# Patient Record
Sex: MALE | Race: WHITE | NOT HISPANIC OR LATINO | Employment: OTHER | URBAN - METROPOLITAN AREA
[De-identification: names, ages, dates, MRNs, and addresses within clinical notes are randomized per-mention and may not be internally consistent; named-entity substitution may affect disease eponyms.]

---

## 2017-11-02 ENCOUNTER — GENERIC CONVERSION - ENCOUNTER (OUTPATIENT)
Dept: OTHER | Facility: OTHER | Age: 77
End: 2017-11-02

## 2017-11-03 ENCOUNTER — ALLSCRIPTS OFFICE VISIT (OUTPATIENT)
Dept: OTHER | Facility: OTHER | Age: 77
End: 2017-11-03

## 2017-11-03 LAB
BILIRUB UR QL STRIP: NORMAL
CLARITY UR: NORMAL
COLOR UR: YELLOW
GLUCOSE (HISTORICAL): NORMAL
HGB UR QL STRIP.AUTO: NORMAL
KETONES UR STRIP-MCNC: NORMAL MG/DL
LEUKOCYTE ESTERASE UR QL STRIP: NORMAL
NITRITE UR QL STRIP: NORMAL
PH UR STRIP.AUTO: 6.5 [PH]
PROT UR STRIP-MCNC: NORMAL MG/DL
SP GR UR STRIP.AUTO: 1.01
UROBILINOGEN UR QL STRIP.AUTO: 0.2

## 2018-01-13 VITALS
DIASTOLIC BLOOD PRESSURE: 82 MMHG | BODY MASS INDEX: 27.58 KG/M2 | HEIGHT: 68 IN | WEIGHT: 182 LBS | SYSTOLIC BLOOD PRESSURE: 136 MMHG

## 2018-01-14 NOTE — MISCELLANEOUS
Message   Recorded as Task  Date: 11/02/2017 12:05 PM, Created By: Ricki Randle  Task Name: Call Back  Assigned To: Kevin FultonB,TEAM  Regarding Patient: Lisa Murphy, Status: Active  CommentIsreal Prileslie - 02 Nov 2017 12:05 PM    TASK CREATED  Caller: Self; [phone] (Home)  Pt asking for medication recommendation as Doxazosin not helping he's having frequency & slow stream  Natalie Stiles - 02 Nov 2017 12:25 PM    TASK EDITED  SPOKE TO PT  TAKING DOXAZOSIN 2 MG DAILY  STREAM NOW SLOW AND NOCTURIA INCREASED FROM 1X TO 3X'S  DECLINED APPT TO BE RE-EVALUATED  WILL DIRECT TO DR Anahi Jose  PER DR VOGT, PT NEEDS CYSTO TO RE-EVALUATE BPH  PT NOTIFIED APPT 11/3 1        1 Amended By: Dulce Wilkerson;  Nov 02 2017 2:48 PM EST    Signatures   Electronically signed by : Marsha Morales RN; Nov 2 2017  2:48PM EST                       (Author)

## 2018-08-21 DIAGNOSIS — N13.9 BENIGN LOCALIZED HYPERPLASIA OF PROSTATE WITH URINARY OBSTRUCTION AND LOWER URINARY TRACT SYMPTOMS: Primary | ICD-10-CM

## 2018-08-21 DIAGNOSIS — N40.1 BENIGN LOCALIZED HYPERPLASIA OF PROSTATE WITH URINARY OBSTRUCTION AND LOWER URINARY TRACT SYMPTOMS: Primary | ICD-10-CM

## 2018-08-21 RX ORDER — FINASTERIDE 5 MG/1
5 TABLET, FILM COATED ORAL DAILY
Qty: 90 TABLET | Refills: 0 | Status: SHIPPED | OUTPATIENT
Start: 2018-08-21 | End: 2018-09-06 | Stop reason: SDUPTHER

## 2018-08-21 NOTE — TELEPHONE ENCOUNTER
Patient called requesting refill on Finasterid 5mg    Request for same, 90 day supply with NO refills was queued and forwarded to Dr Eulogio Cain for approval

## 2018-08-30 ENCOUNTER — TELEPHONE (OUTPATIENT)
Dept: UROLOGY | Facility: AMBULATORY SURGERY CENTER | Age: 78
End: 2018-08-30

## 2018-08-30 NOTE — TELEPHONE ENCOUNTER
Spoke to patient who stated he was very clear that the refill script for Finasteride to be sent to [facility] mail order and it was sent to [facility] his secondary pharmacy  Will forward to Dr Oneyda Ray to resend to [facility] pharmacy

## 2018-09-06 DIAGNOSIS — N40.1 BENIGN LOCALIZED HYPERPLASIA OF PROSTATE WITH URINARY OBSTRUCTION AND LOWER URINARY TRACT SYMPTOMS: ICD-10-CM

## 2018-09-06 DIAGNOSIS — N13.9 BENIGN LOCALIZED HYPERPLASIA OF PROSTATE WITH URINARY OBSTRUCTION AND LOWER URINARY TRACT SYMPTOMS: ICD-10-CM

## 2018-09-06 RX ORDER — FINASTERIDE 5 MG/1
5 TABLET, FILM COATED ORAL DAILY
Qty: 90 TABLET | Refills: 0 | Status: SHIPPED | OUTPATIENT
Start: 2018-09-06 | End: 2018-12-04 | Stop reason: SDUPTHER

## 2018-09-06 NOTE — TELEPHONE ENCOUNTER
Pt is calling regarding the medications that was sent to the wrong pharmacy  He still has not received the medication   Please advise [phone]

## 2018-09-06 NOTE — TELEPHONE ENCOUNTER
Spoke to pt and the new RX for the finasteride was cued up and sent to Dr Ave Snow to approve for [address]

## 2018-10-11 RX ORDER — IBUPROFEN 600 MG/1
600 TABLET ORAL EVERY 6 HOURS PRN
Refills: 0 | COMMUNITY
Start: 2018-09-23

## 2018-10-15 ENCOUNTER — OFFICE VISIT (OUTPATIENT)
Dept: UROLOGY | Facility: MEDICAL CENTER | Age: 78
End: 2018-10-15
Payer: MEDICARE

## 2018-10-15 VITALS
BODY MASS INDEX: 26.96 KG/M2 | WEIGHT: 182 LBS | SYSTOLIC BLOOD PRESSURE: 142 MMHG | HEIGHT: 69 IN | DIASTOLIC BLOOD PRESSURE: 82 MMHG

## 2018-10-15 DIAGNOSIS — N40.1 BPH WITH OBSTRUCTION/LOWER URINARY TRACT SYMPTOMS: Primary | ICD-10-CM

## 2018-10-15 DIAGNOSIS — N13.8 BPH WITH OBSTRUCTION/LOWER URINARY TRACT SYMPTOMS: Primary | ICD-10-CM

## 2018-10-15 LAB
SL AMB  POCT GLUCOSE, UA: NORMAL
SL AMB LEUKOCYTE ESTERASE,UA: NORMAL
SL AMB POCT BILIRUBIN,UA: NORMAL
SL AMB POCT BLOOD,UA: NORMAL
SL AMB POCT CLARITY,UA: CLEAR
SL AMB POCT COLOR,UA: YELLOW
SL AMB POCT KETONES,UA: NORMAL
SL AMB POCT NITRITE,UA: NORMAL
SL AMB POCT PH,UA: 7
SL AMB POCT SPECIFIC GRAVITY,UA: 1.02
SL AMB POCT URINE PROTEIN: NORMAL
SL AMB POCT UROBILINOGEN: 0.2

## 2018-10-15 PROCEDURE — 99213 OFFICE O/P EST LOW 20 MIN: CPT | Performed by: UROLOGY

## 2018-10-15 PROCEDURE — 81003 URINALYSIS AUTO W/O SCOPE: CPT | Performed by: UROLOGY

## 2018-10-15 RX ORDER — MELATONIN
1000 DAILY
COMMUNITY
End: 2020-08-06 | Stop reason: ALTCHOICE

## 2018-10-15 RX ORDER — DOXAZOSIN 2 MG/1
2 TABLET ORAL
COMMUNITY
End: 2018-10-25 | Stop reason: ALTCHOICE

## 2018-10-15 NOTE — PROGRESS NOTES
Assessment/Plan:   1  Symptomatic BPH, patient feels like things are acceptable on his meds  2   Last PSA 3 5, he does not want any more PSA testing  3   Prior cysto showed some degree of outlet obstruction, will continue to follow conservatively and less symptoms worse  4   Follow-up one year   Diagnoses and all orders for this visit:    BPH with obstruction/lower urinary tract symptoms  -     POCT urine dip auto non-scope    Other orders  -     ibuprofen (MOTRIN) 600 mg tablet; Take 600 mg by mouth every 6 (six) hours as needed  -     cholecalciferol (VITAMIN D3) 1,000 units tablet; Take 1,000 Units by mouth daily  -     doxazosin (CARDURA) 2 mg tablet; Take 2 mg by mouth daily at bedtime          Subjective:     Patient ID: Sonny Majano is a 66 y o  male  Follow-up for BPH, on Cardura and finasteride for some years  Symptoms about the same, slow flow, some urgency, but stream is okay, feels like empties all right  Nocturia times 1-2  No hematuria infections stones  Review of Systems   All other systems reviewed and are negative  Objective:     Physical Exam   Constitutional: He is oriented to person, place, and time  He appears well-developed and well-nourished  No distress  HENT:   Head: Normocephalic and atraumatic  Eyes: Conjunctivae are normal    Cardiovascular: Normal rate and regular rhythm  Pulmonary/Chest: Effort normal and breath sounds normal  No respiratory distress  He has no wheezes  Abdominal: Soft  Bowel sounds are normal  He exhibits no distension and no mass  There is no tenderness  Genitourinary:   Genitourinary Comments: Penis and testes normal    Prostate moderately enlarged bulky no changes   Neurological: He is alert and oriented to person, place, and time  Skin: Skin is warm and dry  He is not diaphoretic

## 2018-10-15 NOTE — LETTER
November 2, 2018     Tarun Coppola MD  [address] [address] [employer]    Patient: Jose Daniel Nguyễn   YOB: 1940   Date of Visit: 10/15/2018       Dear Dr Jaci Campbell: Thank you for referring Odalys Voss to me for evaluation  Below are my notes for this consultation  If you have questions, please do not hesitate to call me  I look forward to following your patient along with you  Sincerely,        Adria Ewing MD        CC: No Recipients  Adria Ewing MD  10/15/2018 11:44 AM  Sign at close encounter  Assessment/Plan:   1  Symptomatic BPH, patient feels like things are acceptable on his meds  2   Last PSA 3 5, he does not want any more PSA testing  3   Prior cysto showed some degree of outlet obstruction, will continue to follow conservatively and less symptoms worse  4   Follow-up one year   Diagnoses and all orders for this visit:    BPH with obstruction/lower urinary tract symptoms  -     POCT urine dip auto non-scope    Other orders  -     ibuprofen (MOTRIN) 600 mg tablet; Take 600 mg by mouth every 6 (six) hours as needed  -     cholecalciferol (VITAMIN D3) 1,000 units tablet; Take 1,000 Units by mouth daily  -     doxazosin (CARDURA) 2 mg tablet; Take 2 mg by mouth daily at bedtime          Subjective:     Patient ID: Jose Daniel Nguyễn is a 66 y o  male  Follow-up for BPH, on Cardura and finasteride for some years  Symptoms about the same, slow flow, some urgency, but stream is okay, feels like empties all right  Nocturia times 1-2  No hematuria infections stones  Review of Systems   All other systems reviewed and are negative  Objective:     Physical Exam   Constitutional: He is oriented to person, place, and time  He appears well-developed and well-nourished  No distress  HENT:   Head: Normocephalic and atraumatic  Eyes: Conjunctivae are normal    Cardiovascular: Normal rate and regular rhythm      Pulmonary/Chest: Effort normal and breath sounds normal  No respiratory distress  He has no wheezes  Abdominal: Soft  Bowel sounds are normal  He exhibits no distension and no mass  There is no tenderness  Genitourinary:   Genitourinary Comments: Penis and testes normal    Prostate moderately enlarged bulky no changes   Neurological: He is alert and oriented to person, place, and time  Skin: Skin is warm and dry  He is not diaphoretic

## 2018-10-25 ENCOUNTER — TELEPHONE (OUTPATIENT)
Dept: UROLOGY | Facility: AMBULATORY SURGERY CENTER | Age: 78
End: 2018-10-25

## 2018-10-25 DIAGNOSIS — N40.1 BPH WITH OBSTRUCTION/LOWER URINARY TRACT SYMPTOMS: Primary | ICD-10-CM

## 2018-10-25 DIAGNOSIS — N13.8 BPH WITH OBSTRUCTION/LOWER URINARY TRACT SYMPTOMS: Primary | ICD-10-CM

## 2018-10-25 RX ORDER — TAMSULOSIN HYDROCHLORIDE 0.4 MG/1
CAPSULE ORAL
Qty: 30 CAPSULE | Refills: 0 | Status: SHIPPED | OUTPATIENT
Start: 2018-10-25 | End: 2018-11-21 | Stop reason: SDUPTHER

## 2018-10-25 RX ORDER — TAMSULOSIN HYDROCHLORIDE 0.4 MG/1
CAPSULE ORAL
Qty: 90 CAPSULE | Refills: 3 | Status: ON HOLD | OUTPATIENT
Start: 2018-10-25 | End: 2020-10-20

## 2018-10-25 NOTE — TELEPHONE ENCOUNTER
Spoke with patient, he said he wants to stop taking  doxazosin due to blood pressure issues according to his PCP  He would like to try flomax  Please call patient back  Thank you

## 2018-10-31 ENCOUNTER — TELEPHONE (OUTPATIENT)
Dept: UROLOGY | Facility: MEDICAL CENTER | Age: 78
End: 2018-10-31

## 2018-10-31 NOTE — TELEPHONE ENCOUNTER
Patient calling stating [facility] pharmacy NOT processing his new script for Tamsulosin because they think he's still taking Doxazosin  Patient gave me a telephone number to call and clear-up the problem ([phone])  Patient provided pharmacy benefit information of:  BIN#:  [id number] - PCN#:  [id number] - GRP#:   - ID#:  [id number]  I called the above number and spoke with [facility]  [employer] still requires a prior authorization stating why that patient switched from one drug to another  Patient mentioned that the Doxazosin was affecting his blood pressure to much and his PCP wants his off the drug  Prior Auth system "crashed" as we were nearing the end of the request       I hung up with them and attempted to submit the Prior Authorization via [employer]  Apparently his ID number was incorrect  I then called back and spoke with Karime  Prior authorization completed by phone  Final determination is expected within 24-72 hours

## 2018-11-02 NOTE — TELEPHONE ENCOUNTER
Prior Authorization for Tamsulosin 0 4mg was APPROVED and valid from 11/2/18 until 11/2/19  Patient aware of same

## 2018-11-21 DIAGNOSIS — N13.8 BPH WITH OBSTRUCTION/LOWER URINARY TRACT SYMPTOMS: ICD-10-CM

## 2018-11-21 DIAGNOSIS — N40.1 BPH WITH OBSTRUCTION/LOWER URINARY TRACT SYMPTOMS: ICD-10-CM

## 2018-11-21 RX ORDER — TAMSULOSIN HYDROCHLORIDE 0.4 MG/1
CAPSULE ORAL
Qty: 30 CAPSULE | Refills: 0 | Status: SHIPPED | OUTPATIENT
Start: 2018-11-21 | End: 2019-09-23 | Stop reason: SDUPTHER

## 2018-12-04 DIAGNOSIS — N13.9 BENIGN LOCALIZED HYPERPLASIA OF PROSTATE WITH URINARY OBSTRUCTION AND LOWER URINARY TRACT SYMPTOMS: ICD-10-CM

## 2018-12-04 DIAGNOSIS — N40.1 BENIGN LOCALIZED HYPERPLASIA OF PROSTATE WITH URINARY OBSTRUCTION AND LOWER URINARY TRACT SYMPTOMS: ICD-10-CM

## 2018-12-04 RX ORDER — FINASTERIDE 5 MG/1
TABLET, FILM COATED ORAL
Qty: 90 TABLET | Refills: 3 | Status: SHIPPED | OUTPATIENT
Start: 2018-12-04 | End: 2020-08-06 | Stop reason: ALTCHOICE

## 2019-07-01 ENCOUNTER — TRANSCRIBE ORDERS (OUTPATIENT)
Dept: PHYSICAL THERAPY | Facility: CLINIC | Age: 79
End: 2019-07-01

## 2019-07-01 ENCOUNTER — EVALUATION (OUTPATIENT)
Dept: PHYSICAL THERAPY | Facility: CLINIC | Age: 79
End: 2019-07-01
Payer: MEDICARE

## 2019-07-01 DIAGNOSIS — M75.121 COMPLETE TEAR OF RIGHT ROTATOR CUFF, UNSPECIFIED WHETHER TRAUMATIC: Primary | ICD-10-CM

## 2019-07-01 PROCEDURE — 97110 THERAPEUTIC EXERCISES: CPT | Performed by: PHYSICAL THERAPIST

## 2019-07-01 PROCEDURE — 97161 PT EVAL LOW COMPLEX 20 MIN: CPT | Performed by: PHYSICAL THERAPIST

## 2019-07-01 NOTE — PROGRESS NOTES
PT Evaluation     Today's date: 2019  Patient name: Yessy Yeh  : 1940  MRN: [id number]  Referring provider: Stann Goltz  Dx:   Encounter Diagnosis     ICD-10-CM    1  Complete tear of right rotator cuff, unspecified whether traumatic M75 121                   Assessment  Assessment details: Patient is a 78 y o  male who  presents with pain, range of motion loss, weakness associated with a diagnosis of a R rotator cuff tear  PMH significant for a previous repair about 10 years ago  Trying to avoid TSA   Functional limitations as a result of impairments  Patient may benefit from course of skilled physical therapy to address above listed impairments in an effort to improve function  Understanding of Dx/Px/POC: excellent  Goals  Short Term Goals:  1) Pain : Decrease R shoulder pain to 3/10 at worst x 1 continuous week within 3-4weeks  2) AROM: Improve AROM by at least , 6 degrees ER and 1vertebral levels for IR within 3-4weeks  3) Strength: Improved UE strength by at least 1/2 grade for all noted as weak within 3-4 weeks  4) Function: Improved FOTO score from IE within 3-4 weeks, patient to note greater use of arm for ADLs  LongTerm Goals:   1) Pain : Decrease R shoulder pain to 110 at worst x 1 continuous week within 6-8 weeks  2) AROM: Improve AROM to at least 10 degrees ER and to T7 level for IR within 6-8 weeks  3) Strength: Improved UE strength by at leas 1 grade within 6-8 weeks  4) Function: Improved FOTO score to at least 72 ; no reported difficulty with ADLs    5) (I) with HEP        Plan  Patient would benefit from: skilled PT  Planned modality interventions: cryotherapy, TENS, thermotherapy: hydrocollator packs and ultrasound  Planned therapy interventions: ADL training, body mechanics training, functional ROM exercises, home exercise program, joint mobilization, manual therapy, neuromuscular re-education, patient education, postural training, strengthening, stretching, therapeutic activities and therapeutic exercise  Frequency: 2-3 x's per week x 6-8 weeks  Treatment plan discussed with: patient        Subjective Evaluation    History of Present Illness  Mechanism of injury: Patient is a R hand dominant 78 y o  old male who presents for an initial outpatient physical therapy consultation regarding their R shoulder  Notes a history of a R rotator cuff repair about 10 years ago  Was doing well until about a month ago  when pain increased insidiously  Received steroidal injection which helped mildly  Went fly fishing shortly after this time and pain increased significantly with casting  MRI + for R RC tear, irreparable per patient  Now referred for course of therapy  He is hoping to avoid total shoulder replacement and to be able to return to fly fishing  Has pain with lifting, reaching activities/ADLs  Is unable to sleep on R side         Pain  Current pain ratin  At worst pain ratin  Quality: sharp and radiating  Aggravating factors: overhead activity and lifting    Social Support    Working: retired  Hand dominance: right  Life stress: PAtient is an avid fly fisherman  Has had pain with casting      Patient Goals  Patient goals for therapy: decreased pain, increased strength, independence with ADLs/IADLs and increased motion          Objective     Active Range of Motion     Right Shoulder   Flexion: 165 degrees with pain  Abduction: 165 degrees with pain  External rotation 90°: 80 degrees  Internal rotation BTB: T9 with pain    Passive Range of Motion     Right Shoulder   Normal passive range of motion  Internal rotation 90°: with pain    Strength/Myotome Testing     Right Shoulder     Planes of Motion   Flexion: 3+   Extension: 4   Abduction: 4-   Adduction: 5   External rotation at 0°: 3+   Internal rotation at 0°: 4+     Isolated Muscles   Biceps: 5   Lower trapezius: 3+   Rhomboids: 4-   Serratus anterior: 5   Triceps: 5     Tests Right Shoulder   Positive empty can and Hawkin's  Negative drop arm, external rotation lag sign and lift-off         Flowsheet Rows      Most Recent Value   PT/OT G-Codes   Current Score  59   Projected Score  72                  Precautions: none    Daily Treatment Diary       Manuals 7/1/19            PROM R shoulderER/IR (gentle) NV                                                   Exercise Diary              UBE NV            Tband Rows NV            Tband Extension NV            Tband IR NV            submax mando ER NV            No band robbery NV            Ball circles @ wall NV            Prone rhomboids NV            Prone middle traps NV            Manual rhythmic stabilization (FF/Ext, Horis Abd/Add @ 90 degrees flexion) NV                                                                                                                                                                        Modalities             Cryo R shoulder 10 min

## 2019-07-01 NOTE — LETTER
2019    Sydney Osler, PA-C  [facility]  [facility]    Patient: Josh Inman   YOB: 1940   Date of Visit: 2019     Encounter Diagnosis     ICD-10-CM    1  Complete tear of right rotator cuff, unspecified whether traumatic M75 121        Dear Dr Hummel Auxvasse:    Please review the attached Plan of Care from [facility] Cosmo's recent visit  Please verify that you agree therapy should continue by signing the attached document and sending it back to our office  If you have any questions or concerns, please don't hesitate to call  Sincerely,    Yolette Harding, PT      Referring Provider:      I certify that I have read the below Plan of Care and certify the need for these services furnished under this plan of treatment while under my care  Sydney Osler, PA-C  [facility]  [address] [place] [address] Street: [phone]          PT Evaluation     Today's date: 2019  Patient name: Josh Inman  : 1940  MRN: [id number]  Referring provider: Jazz Granados  Dx:   Encounter Diagnosis     ICD-10-CM    1  Complete tear of right rotator cuff, unspecified whether traumatic M75 121                   Assessment  Assessment details: Patient is a 78 y o  male who  presents with pain, range of motion loss, weakness associated with a diagnosis of a R rotator cuff tear  PMH significant for a previous repair about 10 years ago  Trying to avoid TSA   Functional limitations as a result of impairments  Patient may benefit from course of skilled physical therapy to address above listed impairments in an effort to improve function  Understanding of Dx/Px/POC: excellent  Goals  Short Term Goals:  1) Pain : Decrease R shoulder pain to 3/10 at worst x 1 continuous week within 3-4weeks    2) AROM: Improve AROM by at least , 6 degrees ER and 1vertebral levels for IR within 3-4weeks  3) Strength: Improved UE strength by at least 1/2 grade for all noted as weak within 3-4 weeks  4) Function: Improved FOTO score from IE within 3-4 weeks, patient to note greater use of arm for ADLs  LongTerm Goals:   1) Pain : Decrease R shoulder pain to 110 at worst x 1 continuous week within 6-8 weeks  2) AROM: Improve AROM to at least 10 degrees ER and to T7 level for IR within 6-8 weeks  3) Strength: Improved UE strength by at leas 1 grade within 6-8 weeks  4) Function: Improved FOTO score to at least 72 ; no reported difficulty with ADLs  5) (I) with HEP        Plan  Patient would benefit from: skilled PT  Planned modality interventions: cryotherapy, TENS, thermotherapy: hydrocollator packs and ultrasound  Planned therapy interventions: ADL training, body mechanics training, functional ROM exercises, home exercise program, joint mobilization, manual therapy, neuromuscular re-education, patient education, postural training, strengthening, stretching, therapeutic activities and therapeutic exercise  Frequency: 2-3 x's per week x 6-8 weeks  Treatment plan discussed with: patient        Subjective Evaluation    History of Present Illness  Mechanism of injury: Patient is a R hand dominant 78 y o  old male who presents for an initial outpatient physical therapy consultation regarding their R shoulder  Notes a history of a R rotator cuff repair about 10 years ago  Was doing well until about a month ago  when pain increased insidiously  Received steroidal injection which helped mildly  Went fly fishing shortly after this time and pain increased significantly with casting  MRI + for R RC tear, irreparable per patient  Now referred for course of therapy  He is hoping to avoid total shoulder replacement and to be able to return to fly fishing  Has pain with lifting, reaching activities/ADLs  Is unable to sleep on R side                 Pain  Current pain ratin  At worst pain rating: 6  Quality: sharp and radiating  Aggravating factors: overhead activity and lifting    Social Support    Working: retired  Hand dominance: right  Life stress: PAtient is an avid fly fisherman  Has had pain with casting  Patient Goals  Patient goals for therapy: decreased pain, increased strength, independence with ADLs/IADLs and increased motion          Objective     Active Range of Motion     Right Shoulder   Flexion: 165 degrees with pain  Abduction: 165 degrees with pain  External rotation 90°:  80 degrees  Internal rotation BTB: T9 with pain    Passive Range of Motion     Right Shoulder   Normal passive range of motion  Internal rotation 90°:  with pain    Strength/Myotome Testing     Right Shoulder     Planes of Motion   Flexion: 3+   Extension: 4   Abduction: 4-   Adduction: 5   External rotation at 0°:  3+   Internal rotation at 0°:  4+     Isolated Muscles   Biceps: 5   Lower trapezius: 3+   Rhomboids: 4-   Serratus anterior: 5   Triceps: 5     Tests     Right Shoulder   Positive empty can and Hawkin's  Negative drop arm, external rotation lag sign and lift-off         Flowsheet Rows      Most Recent Value   PT/OT G-Codes   Current Score  59   Projected Score  72                  Precautions: none    Daily Treatment Diary       Manuals 7/1/19            PROM R shoulderER/IR (gentle) NV                                                   Exercise Diary              UBE NV            Tband Rows NV            Tband Extension NV            Tband IR NV            submax mando ER NV            No band robbery NV            Ball circles @ wall NV            Prone rhomboids NV            Prone middle traps NV            Manual rhythmic stabilization (FF/Ext, Horis Abd/Add @ 90 degrees flexion) NV                                                                                                                                                                        Modalities             Cryo R shoulder 10 min

## 2019-07-03 ENCOUNTER — OFFICE VISIT (OUTPATIENT)
Dept: PHYSICAL THERAPY | Facility: CLINIC | Age: 79
End: 2019-07-03
Payer: MEDICARE

## 2019-07-03 DIAGNOSIS — M75.121 COMPLETE TEAR OF RIGHT ROTATOR CUFF, UNSPECIFIED WHETHER TRAUMATIC: Primary | ICD-10-CM

## 2019-07-03 PROCEDURE — 97140 MANUAL THERAPY 1/> REGIONS: CPT | Performed by: PHYSICAL THERAPIST

## 2019-07-03 PROCEDURE — 97110 THERAPEUTIC EXERCISES: CPT | Performed by: PHYSICAL THERAPIST

## 2019-07-03 NOTE — PROGRESS NOTES
Daily Note     Today's date: 7/3/2019  Patient name: Rashida Heller  : 1940  MRN: [id number]  Referring provider: Gerard Bailon  Dx:   Encounter Diagnosis     ICD-10-CM    1  Complete tear of right rotator cuff, unspecified whether traumatic M75 121                   Subjective: Noting compliance with HEP  Shoulder feeling a little better so patient is optimistic  Objective: See treatment diary below  Progressed UE strengthening  Assessment: Tolerated treatment well  Patient demonstrated fatigue post treatment Patient would benefit from continued course of skilled physical therapy to address impairments in an effort to improve function  Plan: Continue per plan of care              Precautions: none    Daily Treatment Diary       Manuals 7/1/19 7/3           PROM R shoulderER/IR (gentle) NV RG                                                  Exercise Diary              UBE NV 3/3           Tband Rowswith atep back NV Green  2 x 10           Tband Extension woith step back NV Green  2 x 10           Tband IR NV Green   x 30           submax mando ER NV 5sec x 20           No band robbery NV 3sec  2 x 10           Ball circles @ wall NV 10 cw/ccw x 4           Prone rhomboids NV 2 x 10           Prone middle traps NV 2 x 10           Manual rhythmic stabilization (FF/Ext, Horis Abd/Add @ 90 degrees flexion) NV RG           Prone rows  3sec x 30                                                                                                                                                          Modalities             Cryo R shoulder 10 min 10 min

## 2019-07-08 ENCOUNTER — OFFICE VISIT (OUTPATIENT)
Dept: PHYSICAL THERAPY | Facility: CLINIC | Age: 79
End: 2019-07-08
Payer: MEDICARE

## 2019-07-08 DIAGNOSIS — M75.121 COMPLETE TEAR OF RIGHT ROTATOR CUFF, UNSPECIFIED WHETHER TRAUMATIC: Primary | ICD-10-CM

## 2019-07-08 PROCEDURE — 97140 MANUAL THERAPY 1/> REGIONS: CPT | Performed by: PHYSICAL THERAPIST

## 2019-07-08 PROCEDURE — 97110 THERAPEUTIC EXERCISES: CPT | Performed by: PHYSICAL THERAPIST

## 2019-07-08 NOTE — PROGRESS NOTES
Daily Note     Today's date: 2019  Patient name: Dang Johnston  : 1940  MRN: [id number]  Referring provider: Iraida Chand  Dx:   Encounter Diagnosis     ICD-10-CM    1  Complete tear of right rotator cuff, unspecified whether traumatic M75 121                   Subjective: Felt some increased R> L clavicular pain on day following last visit  He can not attribute to any one exercise   This has since improved  Objective: See treatment diary below  Progressed periscapular strengthening  Assessment: Still early in rehab process  Unable to tell at this point if rehab will be adequate to restore function or if patient will ultimately require surgery  Patientay benefit from continued course of skilled physical therapy to address impairments in an effort to improve function  Plan: Continue per plan of care              Precautions: none    Daily Treatment Diary       Manuals 7/1/19 7/3 7/8          PROM R shoulderER/IR (gentle) NV RG Rg                                                 Exercise Diary              UBE NV 3/3 3/3          Tband Rowswith atep back NV Green  2 x 10 Green  3 x 10          Tband Extension woith step back NV Green  2 x 10 Green 3 x 10          Tband IR NV Green   x 30 Green3 x 10          submax mando ER NV 5sec x 20 5sec x 20          No band robbery NV 3sec  2 x 10 3sec 2 x 10          Ball circles @ wall NV 10 cw/ccw x 4 20 cw/ccw x 3          Prone rhomboids NV 2 x 10 2 x 10          Prone middle traps NV 2 x 10 2 x 10          Manual rhythmic stabilization (FF/Ext, Horis Abd/Add @ 90 degrees flexion) NV RG RG          Prone rows  3sec x 30 NP          SA punchups   0#  2 x 20 3#         SL shoulder flexion to 90 degrees   NV                                                                                                                               Modalities             Cryo R shoulder 10 min 10 min 10 min

## 2019-07-11 ENCOUNTER — OFFICE VISIT (OUTPATIENT)
Dept: PHYSICAL THERAPY | Facility: CLINIC | Age: 79
End: 2019-07-11
Payer: MEDICARE

## 2019-07-11 DIAGNOSIS — M75.121 COMPLETE TEAR OF RIGHT ROTATOR CUFF, UNSPECIFIED WHETHER TRAUMATIC: Primary | ICD-10-CM

## 2019-07-11 PROCEDURE — 97110 THERAPEUTIC EXERCISES: CPT

## 2019-07-11 PROCEDURE — 97140 MANUAL THERAPY 1/> REGIONS: CPT

## 2019-07-11 NOTE — PROGRESS NOTES
Daily Note     Today's date: 2019  Patient name: Rashida Heller  : 1940  MRN: [id number]  Referring provider: Gerard Bailon  Dx:   Encounter Diagnosis     ICD-10-CM    1  Complete tear of right rotator cuff, unspecified whether traumatic M75 121                   Subjective: Patient noted no new complaints patient noted " I think I'm making progress  "                    Objective: See treatment diary below      Assessment: Tolerated treatment well  Patient was able to perform serratus punches with added 3# with no patient complaints  Minimal VC to perform prone rhomboid exercise to correct patient form to have thumbs down while performing  Patient would benefit from continued PT  Plan: Continue per plan of care              Precautions: none    Daily Treatment Diary       Manuals 7/1/19 7/3 7/8 7/11         PROM R shoulderER/IR (gentle) NV RG Rg af                                                Exercise Diary              UBE NV 3/3 3/3 3/3         Tband Rowswith atep back NV Green  2 x 10 Green  3 x 10 GTB 3x10         Tband Extension woith step back NV Green  2 x 10 Green 3 x 10 GTB 3x10         Tband IR NV Green   x 30 Green3 x 10 GTB 3x10         submax mando ER NV 5sec x 20 5sec x 20 5sec x 20         No band robbery NV 3sec  2 x 10 3sec 2 x 10 3sec 2 x 10           Ball circles @ wall NV 10 cw/ccw x 4 20 cw/ccw x 3 20         Prone rhomboids NV 2 x 10 2 x 10 2x10         Prone middle traps NV 2 x 10 2 x 10 2x10         Manual rhythmic stabilization (FF/Ext, Horis Abd/Add @ 90 degrees flexion) NV RG RG af         Prone rows  3sec x 30 NP np         SA punchups   0#  2 x 20 3# 2x10         SL shoulder flexion to 90 degrees   NV 15x                                                                                                                              Modalities             Cryo R shoulder 10 min 10 min 10 min 10min

## 2019-07-15 ENCOUNTER — OFFICE VISIT (OUTPATIENT)
Dept: PHYSICAL THERAPY | Facility: CLINIC | Age: 79
End: 2019-07-15
Payer: MEDICARE

## 2019-07-15 DIAGNOSIS — M75.121 COMPLETE TEAR OF RIGHT ROTATOR CUFF, UNSPECIFIED WHETHER TRAUMATIC: Primary | ICD-10-CM

## 2019-07-15 PROCEDURE — 97110 THERAPEUTIC EXERCISES: CPT

## 2019-07-15 PROCEDURE — 97140 MANUAL THERAPY 1/> REGIONS: CPT

## 2019-07-15 NOTE — PROGRESS NOTES
Daily Note     Today's date: 7/15/2019  Patient name: Yessy Yeh  : 1940  MRN: [id number]  Referring provider: Stann Goltz  Dx:   Encounter Diagnosis     ICD-10-CM    1  Complete tear of right rotator cuff, unspecified whether traumatic M75 121        Start Time: 1400  Stop Time: 1500  Total time in clinic (min): 60 minutes    Subjective: Pt reports pain in R shldr is "no worse than usual "  States he is still optimistic about everything with his recovery  Objective: See treatment diary below      Assessment: Tolerated treatment well  Was able to perform all exercise with no c/o pain  Minimal VC required for proper form and intensity with exercises performed throughout session  Frequent guarding of RUE during PROM, but would be able to relax with verbal cues  Patient would benefit from continued PT for additional strengthening/stabilization of R shldr to decrease pain and improve function  Plan: Continue per plan of care              Precautions: none    Daily Treatment Diary       Manuals 7/1/19 7/3 7/8 7/11 7/15        PROM R shoulderER/IR (gentle) NV RG Rg af [employer]                                               Exercise Diary              UBE NV 3/3 33 3/3 3/3        Tband Rowswith atep back NV Green  2 x 10 Green  3 x 10 GTB 3x10 GTB 3x10        Tband Extension woith step back NV Green  2 x 10 Green 3 x 10 GTB 3x10 GTB 3x10        Tband IR NV Green   x 30 Green3 x 10 GTB 3x10 GTB 3x10        submax mando ER NV 5sec x 20 5sec x 20 5sec x 20 5 sec  x20        No band robbery NV 3sec  2 x 10 3sec 2 x 10 3sec 2 x 10   3sec 2 x 10        Ball circles @ wall NV 10 cw/ccw x 4 20 cw/ccw x 3 20 20 cw/ccw x 3        Prone rhomboids NV 2 x 10 2 x 10 2x10 2x10        Prone middle traps NV 2 x 10 2 x 10 2x10 2x10        Manual rhythmic stabilization (FF/Ext, Horis Abd/Add @ 90 degrees flexion) NV RG RG af AFB        Prone rows  3sec x 30 NP np np        SA punchups   0#  2 x 20 3# 2x10 3# 2x10        SL shoulder flexion to 90 degrees   NV 15x 15x                                                                                                                             Modalities             Cryo R shoulder 10 min 10 min 10 min 10min 10 min

## 2019-07-18 ENCOUNTER — OFFICE VISIT (OUTPATIENT)
Dept: PHYSICAL THERAPY | Facility: CLINIC | Age: 79
End: 2019-07-18
Payer: MEDICARE

## 2019-07-18 DIAGNOSIS — M75.121 COMPLETE TEAR OF RIGHT ROTATOR CUFF, UNSPECIFIED WHETHER TRAUMATIC: Primary | ICD-10-CM

## 2019-07-18 PROCEDURE — 97110 THERAPEUTIC EXERCISES: CPT

## 2019-07-18 PROCEDURE — 97140 MANUAL THERAPY 1/> REGIONS: CPT

## 2019-07-18 NOTE — PROGRESS NOTES
Daily Note     Today's date: 2019  Patient name: Zari Manriquez  : 1940  MRN: [id number]  Referring provider: Cyndy Martinez  Dx:   Encounter Diagnosis     ICD-10-CM    1  Complete tear of right rotator cuff, unspecified whether traumatic M75 121                   Subjective: Patient noted no new complaints  Patient has good compliance with HEP  Objective: See treatment diary below      Assessment: Tolerated treatment well  Patient responded well to exercise progressions of reps and weights for  Serratus punches  Patient exhibited good technique with therapeutic exercises and would benefit from continued PT      Plan: Continue per plan of care              Precautions: none    Daily Treatment Diary       Manuals 7/1/19 7/3 7/8 7/11 7/15 7/18       PROM R shoulderER/IR (gentle) NV RG Rg af [employer] af                                              Exercise Diary              UBE NV 3/3 3/3 3/3 3/3 3/3       Tband Rowswith atep back NV Green  2 x 10 Green  3 x 10 GTB 3x10 GTB 3x10 GTB 3x10       Tband Extension woith step back NV Green  2 x 10 Green 3 x 10 GTB 3x10 GTB 3x10 GTB 3x10       Tband IR NV Green   x 30 Green3 x 10 GTB 3x10 GTB 3x10 GTB 3x10       submax mando ER NV 5sec x 20 5sec x 20 5sec x 20 5 sec  x20  5 sec  x20       No band robbery NV 3sec  2 x 10 3sec 2 x 10 3sec 2 x 10   3sec 2 x 10 3sec 2 x 10       Ball circles @ wall NV 10 cw/ccw x 4 20 cw/ccw x 3 20 20 cw/ccw x 3 20 cw/ccw x 3       Prone rhomboids NV 2 x 10 2 x 10 2x10 2x10 3x10       Prone middle traps NV 2 x 10 2 x 10 2x10 2x10 3x10       Manual rhythmic stabilization (FF/Ext, Horis Abd/Add @ 90 degrees flexion) NV RG RG af [employer] af       Prone rows  3sec x 30 NP np np        SA punchups   0#  2 x 20 3# 2x10 3# 2x10 4# 2x10  5# x10       SL shoulder flexion to 90 degrees   NV 15x 15x 20x Modalities             Cryo R shoulder 10 min 10 min 10 min 10min 10 min

## 2019-07-22 ENCOUNTER — OFFICE VISIT (OUTPATIENT)
Dept: PHYSICAL THERAPY | Facility: CLINIC | Age: 79
End: 2019-07-22
Payer: MEDICARE

## 2019-07-22 DIAGNOSIS — M75.121 COMPLETE TEAR OF RIGHT ROTATOR CUFF, UNSPECIFIED WHETHER TRAUMATIC: Primary | ICD-10-CM

## 2019-07-22 PROCEDURE — 97110 THERAPEUTIC EXERCISES: CPT | Performed by: PHYSICAL THERAPIST

## 2019-07-22 PROCEDURE — 97140 MANUAL THERAPY 1/> REGIONS: CPT | Performed by: PHYSICAL THERAPIST

## 2019-07-22 PROCEDURE — 97112 NEUROMUSCULAR REEDUCATION: CPT | Performed by: PHYSICAL THERAPIST

## 2019-07-22 NOTE — LETTER
2019    Annelise Mitchell PA-C  [facility]  [facility]    Patient: Stiven Perez   YOB: 1940   Date of Visit: 2019     Encounter Diagnosis     ICD-10-CM    1  Complete tear of right rotator cuff, unspecified whether traumatic M75 121        Dear Dr Blaine Chris:    Please review the attached Plan of Care from Kaitlin Wilburn's recent visit  Please verify that you agree therapy should continue by signing the attached document and sending it back to our office  If you have any questions or concerns, please don't hesitate to call  Sincerely,    Jd Prater PT      Referring Provider:      I certify that I have read the below Plan of Care and certify the need for these services furnished under this plan of treatment while under my care  Annelise Mitchell PA-C  [facility]  [address] [place] [address],[address]: [phone]          Progress Note     Today's date: 2019  Patient name: Stiven Perez  : 1940  MRN: [id number]  Referring provider: Magnolia Salguero  Dx:   Encounter Diagnosis     ICD-10-CM    1  Complete tear of right rotator cuff, unspecified whether traumatic M75 121                   Subjective: Patient feels his R shoulder pain and R shoulder strength are mildly improved since starting therapy  Still notes pain with lifting activities and after exercise  Has not returned to fly fishing        Objective: See treatment diary below        Active Range of Motion     Right Shoulder   Flexion: 165 degrees with pain  Abduction: 165 degrees with pain  External rotation 90°: 80 degrees  Internal rotation BTB: T7 with pain     Passive Range of Motion         Strength/Myotome Testing     Right Shoulder      Planes of Motion   Flexion:  4   Extension:  5   Abduction: 4  Adduction: 5   External rotation at 0°:  4  Internal rotation at 0°:  5     Isolated Muscles   Biceps: 5   Lower trapezius:  4-  Rhomboids: 4-   Serratus anterior: 5   Triceps: 5      Tests      Right Shoulder   Positive empty can and Hawkin's  Negative drop arm, external rotation lag sign and lift-off  Assessment: Since starting therapy pt has made the following progress towards goals:  1) Decreased pain  2) Improved range of motion  3) Improved strength  4) Improved self rated functional score  (FOTO score improved to 72 from 59 at time of initial visit)    Making good progress overall  He is still hoping to avoid TSA  Recommend continued therapy with focus on functional strengthening  Plan: Continue per plan of care              Precautions: none    Daily Treatment Diary       Manuals 7/1/19 7/3 7/8 7/11 7/15 7/18 7/22      PROM R shoulderER/IR (gentle) NV RG Rg af [employer] af RG                                             Exercise Diary              UBE NV 3/3 3/3 3/3 3/3 3/3 3/3  L3 5      Tband Rowswith atep back NV Green  2 x 10 Green  3 x 10 GTB 3x10 GTB 3x10 GTB 3x10 Blue  3 x 10      Tband Extension woith step back NV Green  2 x 10 Green 3 x 10 GTB 3x10 GTB 3x10 GTB 3x10 Black  3 x 10      Tband IR NV Green   x 30 Green3 x 10 GTB 3x10 GTB 3x10 GTB 3x10 Blue  3 x 10      submax mando ER NV 5sec x 20 5sec x 20 5sec x 20 5 sec  x20  5 sec  x20 D/C      No band robbery NV 3sec  2 x 10 3sec 2 x 10 3sec 2 x 10   3sec 2 x 10 3sec 2 x 10 Webslide   Yellow  2 x 10      Ball circles @ wall NV 10 cw/ccw x 4 20 cw/ccw x 3 20 20 cw/ccw x 3 20 cw/ccw x 3 20 cw/ccw x 4      Prone rhomboids NV 2 x 10 2 x 10 2x10 2x10 3x10 1#  3 x 10      Prone middle traps NV 2 x 10 2 x 10 2x10 2x10 3x10 1#  3 x 10      Manual rhythmic stabilization (FF/Ext, Horis Abd/Add @ 90 degrees flexion) NV RG RG af [employer] af 3 x 10      Prone rows  3sec x 30 NP np np        SA punchups   0#  2 x 20 3# 2x10 3# 2x10 4# 2x10  5# x10 5#  3 x 10      SL shoulder flexion to 90 degrees   NV 15x 15x 20x 2 x 15      Tband ER Webslide  Red  2 x 10                                                                                                              Modalities             Cryo R shoulder 10 min 10 min 10 min 10min 10 min  10 min

## 2019-07-22 NOTE — PROGRESS NOTES
Progress Note     Today's date: 2019  Patient name: Ama Michelle  : 1940  MRN: [id number]  Referring provider: Kaila Blackman  Dx:   Encounter Diagnosis     ICD-10-CM    1  Complete tear of right rotator cuff, unspecified whether traumatic M75 121                   Subjective: Patient feels his R shoulder pain and R shoulder strength are mildly improved since starting therapy  Still notes pain with lifting activities and after exercise  Has not returned to fly fishing  Objective: See treatment diary below        Active Range of Motion     Right Shoulder   Flexion: 165 degrees with pain  Abduction: 165 degrees with pain  External rotation 90°: 80 degrees  Internal rotation BTB: T7 with pain     Passive Range of Motion         Strength/Myotome Testing     Right Shoulder      Planes of Motion   Flexion: 4   Extension: 5   Abduction: 4  Adduction: 5   External rotation at 0°: 4  Internal rotation at 0°: 5     Isolated Muscles   Biceps: 5   Lower trapezius: 4-  Rhomboids: 4-   Serratus anterior: 5   Triceps: 5      Tests      Right Shoulder   Positive empty can and Hawkin's  Negative drop arm, external rotation lag sign and lift-off  Assessment: Since starting therapy pt has made the following progress towards goals:  1) Decreased pain  2) Improved range of motion  3) Improved strength  4) Improved self rated functional score  (FOTO score improved to 72 from 59 at time of initial visit)    Making good progress overall  He is still hoping to avoid TSA  Recommend continued therapy with focus on functional strengthening  Plan: Continue per plan of care              Precautions: none    Daily Treatment Diary       Manuals 7/1/19 7/3 7/8 7/11 7/15 7/18 7/22      PROM R shoulderER/IR (gentle) NV RG Rg af [employer] af RG                                             Exercise Diary              UBE NV 3/3 3/3 3/3 3/3 3/3 3/3  L3 5      Tband Rowswith atep back NV Green  2 x 10 Green  3 x 10 GTB 3x10 GTB 3x10 GTB 3x10 Blue  3 x 10      Tband Extension woith step back NV Green  2 x 10 Green 3 x 10 GTB 3x10 GTB 3x10 GTB 3x10 Black  3 x 10      Tband IR NV Green   x 30 Green3 x 10 GTB 3x10 GTB 3x10 GTB 3x10 Blue  3 x 10      submax mando ER NV 5sec x 20 5sec x 20 5sec x 20 5 sec  x20  5 sec  x20 D/C      No band robbery NV 3sec  2 x 10 3sec 2 x 10 3sec 2 x 10   3sec 2 x 10 3sec 2 x 10 Webslide   Yellow  2 x 10      Ball circles @ wall NV 10 cw/ccw x 4 20 cw/ccw x 3 20 20 cw/ccw x 3 20 cw/ccw x 3 20 cw/ccw x 4      Prone rhomboids NV 2 x 10 2 x 10 2x10 2x10 3x10 1#  3 x 10      Prone middle traps NV 2 x 10 2 x 10 2x10 2x10 3x10 1#  3 x 10      Manual rhythmic stabilization (FF/Ext, Horis Abd/Add @ 90 degrees flexion) NV RG RG af [employer] af 3 x 10      Prone rows  3sec x 30 NP np np        SA punchups   0#  2 x 20 3# 2x10 3# 2x10 4# 2x10  5# x10 5#  3 x 10      SL shoulder flexion to 90 degrees   NV 15x 15x 20x 2 x 15      Tband ER       Webslide  Red  2 x 10                                                                                                              Modalities             Cryo R shoulder 10 min 10 min 10 min 10min 10 min  10 min

## 2019-07-25 ENCOUNTER — OFFICE VISIT (OUTPATIENT)
Dept: PHYSICAL THERAPY | Facility: CLINIC | Age: 79
End: 2019-07-25
Payer: MEDICARE

## 2019-07-25 DIAGNOSIS — M75.121 COMPLETE TEAR OF RIGHT ROTATOR CUFF, UNSPECIFIED WHETHER TRAUMATIC: Primary | ICD-10-CM

## 2019-07-25 PROCEDURE — 97110 THERAPEUTIC EXERCISES: CPT | Performed by: PHYSICAL THERAPIST

## 2019-07-25 PROCEDURE — 97112 NEUROMUSCULAR REEDUCATION: CPT | Performed by: PHYSICAL THERAPIST

## 2019-07-25 PROCEDURE — 97140 MANUAL THERAPY 1/> REGIONS: CPT | Performed by: PHYSICAL THERAPIST

## 2019-07-25 NOTE — PROGRESS NOTES
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  Objective: See treatment diary below  Progressed UE strengthening program     Assessment: Fatigued with progressions  Still lacking functional strength over shoulder level  Plan: Continue per plan of care              Precautions: none    Daily Treatment Diary       Manuals 7/1/19 7/3 7/8 7/11 7/15 7/18 7/22 7/25     PROM R shoulderER/IR (gentle) NV RG Rg af [employer] af RG RG                                            Exercise Diary              UBE NV 3/3 3/3 3/3 3/3 3/3 3/3  L3 5 3/3  L6 0     Tband Rowswith atep back NV Green  2 x 10 Green  3 x 10 GTB 3x10 GTB 3x10 GTB 3x10 Blue  3 x 10 Blue  3 x 10     Tband Extension with step back NV Green  2 x 10 Green 3 x 10 GTB 3x10 GTB 3x10 GTB 3x10 Black  3 x 10 Black 3 x 10     Tband IR NV Green   x 30 Green3 x 10 GTB 3x10 GTB 3x10 GTB 3x10 Blue  3 x 10 Blue  3 x 10     submax mando ER NV 5sec x 20 5sec x 20 5sec x 20 5 sec  x20  5 sec  x20 D/C      No band robbery NV 3sec  2 x 10 3sec 2 x 10 3sec 2 x 10   3sec 2 x 10 3sec 2 x 10 Webslide   Yellow  2 x 10 WS  Yellow  2 x 10     Ball circles @ wall NV 10 cw/ccw x 4 20 cw/ccw x 3 20 20 cw/ccw x 3 20 cw/ccw x 3 20 cw/ccw x 4 20 cw/ccw x 5     Prone rhomboids NV 2 x 10 2 x 10 2x10 2x10 3x10 1#  3 x 10 1#  3 x 10     Prone middle traps NV 2 x 10 2 x 10 2x10 2x10 3x10 1#  3 x 10 1#  3 x 10     Manual rhythmic stabilization (FF/Ext, Horis Abd/Add @ 90 degrees flexion) NV RG RG af [employer] af 3 x 10 3 x 10     Prone rows  3sec x 30 NP np np        SA punchups   0#  2 x 20 3# 2x10 3# 2x10 4# 2x10  5# x10 5#  3 x 10 5#  2 x 20     SL shoulder flexion to 90 degrees   NV 15x 15x 20x 2 x 15 2 x 15     Tband ER       Webslide  Red  2 x 10 WS  2 x 10     AROM scaption        1#  2 x 10                                                                                                Modalities             Cryo R shoulder 10 min 10 min 10 min 10min 10 min  10 min 10 min

## 2019-07-29 ENCOUNTER — OFFICE VISIT (OUTPATIENT)
Dept: PHYSICAL THERAPY | Facility: CLINIC | Age: 79
End: 2019-07-29
Payer: MEDICARE

## 2019-07-29 DIAGNOSIS — M75.121 COMPLETE TEAR OF RIGHT ROTATOR CUFF, UNSPECIFIED WHETHER TRAUMATIC: Primary | ICD-10-CM

## 2019-07-29 PROCEDURE — 97112 NEUROMUSCULAR REEDUCATION: CPT

## 2019-07-29 PROCEDURE — 97110 THERAPEUTIC EXERCISES: CPT

## 2019-07-29 PROCEDURE — 97140 MANUAL THERAPY 1/> REGIONS: CPT

## 2019-07-29 NOTE — PROGRESS NOTES
Daily Note     Today's date: 2019  Patient name: Juanito Chan  : 1940  MRN: [id number]  Referring provider: Denia Juarez  Dx:   Encounter Diagnosis     ICD-10-CM    1  Complete tear of right rotator cuff, unspecified whether traumatic M75 121                   Subjective: Patient reported R shoulder improving but is not where it should be yet  Objective: See treatment diary below  Assessment: Increased difficulty with use of UBE today, reducing resistance  All other exercises he is progressing appropriately with reps and resistance  Remains most restricted in ER and IR planes of motion but has improved  Plan: Continue per plan of care              Precautions: none    Daily Treatment Diary   Manuals 19 7/3 7/8 7/11 7/15 7/18 7/22 7/25 7/29    PROM R shoulder ER/IR (gentle) NV RG Rg af [employer]B af RG RG EH                                           Exercise Diary              UBE NV 3/3 3/3 3/3 3/3 3/3 3/3  L3 5 3/3  L6 0 3 min ea  L4    Tband Rows with step back NV Green  2 x 10 Green  3 x 10 GTB 3x10 GTB 3x10 GTB 3x10 Blue  3 x 10 Blue  3 x 10 Blue  3x10    Tband Extension with step back NV Green  2 x 10 Green 3 x 10 GTB 3x10 GTB 3x10 GTB 3x10 Black  3 x 10 Black 3 x 10 Black 3x10    Tband IR NV Green   x 30 Green3 x 10 GTB 3x10 GTB 3x10 GTB 3x10 Blue  3 x 10 Blue  3 x 10 Blue  3x10    Submax mando ER NV 5sec x 20 5sec x 20 5sec x 20 5 sec  x20  5 sec  x20 D/C      No band robbery NV 3sec  2 x 10 3sec 2 x 10 3sec 2 x 10   3sec 2 x 10 3sec 2 x 10 Webslide   Yellow  2 x 10 WS  Yellow  2 x 10 WS  Yellow  2x10    Ball circles @ wall NV 10 cw/ccw x 4 20 cw/ccw x 3 20 20 cw/ccw x 3 20 cw/ccw x 3 20 cw/ccw x 4 20 cw/ccw x 5 Cw/ccw  20x5    Prone rhomboids NV 2 x 10 2 x 10 2x10 2x10 3x10 1#  3 x 10 1#  3 x 10 1#  3x10    Prone middle traps NV 2 x 10 2 x 10 2x10 2x10 3x10 1#  3 x 10 1#  3 x 10 1#  3x10    Manual rhythmic stabilization (FF/Ext, Horis Abd/Add @ 90 degrees flexion) NV RG RG af [employer]B af 3 x 10 3 x 10 3x10    Prone rows  3sec x 30 NP np np        SA punchups   0#  2 x 20 3# 2x10 3# 2x10 4# 2x10  5# x10 5#  3 x 10 5#  2 x 20 5# 2x20    SL shoulder flexion to 90 degrees   NV 15x 15x 20x 2 x 15 2 x 15 2x15    Tband ER       Webslide  Red  2 x 10 WS  2 x 10 WS  Red  3x10    AROM scaption        1#  2 x 10 1#  2x10                                                                                               Modalities             Cryo R shoulder 10 min 10 min 10 min 10min 10 min  10 min 10 min 10 min post

## 2019-08-01 ENCOUNTER — OFFICE VISIT (OUTPATIENT)
Dept: PHYSICAL THERAPY | Facility: CLINIC | Age: 79
End: 2019-08-01
Payer: MEDICARE

## 2019-08-01 DIAGNOSIS — M75.121 COMPLETE TEAR OF RIGHT ROTATOR CUFF, UNSPECIFIED WHETHER TRAUMATIC: Primary | ICD-10-CM

## 2019-08-01 PROCEDURE — 97110 THERAPEUTIC EXERCISES: CPT | Performed by: PHYSICAL THERAPIST

## 2019-08-01 PROCEDURE — 97112 NEUROMUSCULAR REEDUCATION: CPT | Performed by: PHYSICAL THERAPIST

## 2019-08-01 PROCEDURE — 97140 MANUAL THERAPY 1/> REGIONS: CPT | Performed by: PHYSICAL THERAPIST

## 2019-08-01 NOTE — PROGRESS NOTES
Daily Note     Today's date: 2019  Patient name: Kevyn Ovalle  : 1940  MRN: [id number]  Referring provider: Gracie Kemp  Dx:   Encounter Diagnosis     ICD-10-CM    1  Complete tear of right rotator cuff, unspecified whether traumatic M75 121                   Subjective: "it's better, but still not where I want it to be " Less overall pain since starting therapy, but tried casting (fishing) and this still hurt  Objective: See treatment diary below  Progressed UE strengthening  Assessment: Fatigued with progressions  Making some progress towards goals  Patient would benefit from continued course of skilled physical therapy to address impairments in an effort to improve function  Plan: Continue per plan of care              Precautions: none    Daily Treatment Diary   Manuals 7/1/19 7/3 7/8 7/11 7/15 7/18 7/22 7/25 7/29 8/1   PROM R shoulder ER/IR (gentle) NV RG Rg af [employer]B af RG RG EH RG                                          Exercise Diary              UBE NV 3/3 3/3 3/3 3/3 3/3 3/3  L3 5 3/3  L6 0 3 min ea  L4 3/3   L4   Tband Rows with step back NV Green  2 x 10 Green  3 x 10 GTB 3x10 GTB 3x10 GTB 3x10 Blue  3 x 10 Blue  3 x 10 Blue  3x10 Blue  3 x 10   Tband Extension with step back NV Green  2 x 10 Green 3 x 10 GTB 3x10 GTB 3x10 GTB 3x10 Black  3 x 10 Black 3 x 10 Black 3x10 Black  3 x 10   Tband IR NV Green   x 30 Green3 x 10 GTB 3x10 GTB 3x10 GTB 3x10 Blue  3 x 10 Blue  3 x 10 Blue  3x10 Blue  3 x 10   Submax mando ER NV 5sec x 20 5sec x 20 5sec x 20 5 sec  x20  5 sec  x20 D/C      No band robbery NV 3sec  2 x 10 3sec 2 x 10 3sec 2 x 10   3sec 2 x 10 3sec 2 x 10 Webslide   Yellow  2 x 10 WS  Yellow  2 x 10 WS  Yellow  2x10 WS Yellow  2x 10   Ball circles @ wall NV 10 cw/ccw x 4 20 cw/ccw x 3 20 20 cw/ccw x 3 20 cw/ccw x 3 20 cw/ccw x 4 20 cw/ccw x 5 Cw/ccw  20x5 20 cw/ccw x 4   Prone rhomboids NV 2 x 10 2 x 10 2x10 2x10 3x10 1#  3 x 10 1#  3 x 10 1#  3x10 1#  3  10 Prone middle traps NV 2 x 10 2 x 10 2x10 2x10 3x10 1#  3 x 10 1#  3 x 10 1#  3x10 1#  3 x 10   Manual rhythmic stabilization (FF/Ext, Horis Abd/Add @ 90 degrees flexion) NV RG RG af AFB af 3 x 10 3 x 10 3x10 3 x 10   Prone rows  3sec x 30 NP np np        SA punchups   0#  2 x 20 3# 2x10 3# 2x10 4# 2x10  5# x10 5#  3 x 10 5#  2 x 20 5# 2x20 5#  3 x 20   SL shoulder flexion to 90 degrees   NV 15x 15x 20x 2 x 15 2 x 15 2x15 1#  2 x 15   Tband ER       Webslide  Red  2 x 10 WS  2 x 10 WS  Red  3x10 WS  Red  3 x 0   AROM scaption        1#  2 x 10 1#  2x10 1#  2 x 10   Body blade  H/V @ 90 degrees flexion  (small)          10 sec x 3 each                                                                                 Modalities             Cryo R shoulder 10 min 10 min 10 min 10min 10 min  10 min 10 min 10 min post 10 min post

## 2019-08-05 ENCOUNTER — OFFICE VISIT (OUTPATIENT)
Dept: PHYSICAL THERAPY | Facility: CLINIC | Age: 79
End: 2019-08-05
Payer: MEDICARE

## 2019-08-05 DIAGNOSIS — M75.121 COMPLETE TEAR OF RIGHT ROTATOR CUFF, UNSPECIFIED WHETHER TRAUMATIC: Primary | ICD-10-CM

## 2019-08-05 PROCEDURE — 97112 NEUROMUSCULAR REEDUCATION: CPT

## 2019-08-05 PROCEDURE — 97110 THERAPEUTIC EXERCISES: CPT

## 2019-08-05 PROCEDURE — 97140 MANUAL THERAPY 1/> REGIONS: CPT

## 2019-08-05 NOTE — PROGRESS NOTES
Daily Note     Today's date: 2019  Patient name: Mayuri Murry  : 1940  MRN: [id number]  Referring provider: China Khan  Dx:   Encounter Diagnosis     ICD-10-CM    1  Complete tear of right rotator cuff, unspecified whether traumatic M75 121                   Subjective: Patient noted R shoulder "improving an 1/8 of an inch a day"  Objective: See treatment diary below  Progressed some resistance for RTC and scapular strengthening below  Assessment: Continues to fatigue with RTC and scapular strengthening, most difficulty with progression of TB ER  Fatigued significantly with use of Bodyblade  End range restrictions into both ER and IR remain with some guarding present  Continue with progressions as appropriate to increase functional strength  Plan: Continue per plan of care            Precautions: none    Daily Treatment Diary   Manuals             PROM R shoulder ER/IR (gentle) EH                                                   Exercise Diary              UBE 3 min ea  L4            Tband Rows with step back Black  3x10            Tband Extension with step back Black  3x10            Tband IR Blue  3x10            Submax mando ER NP            No band robbery WS  Yellow  2x10            Ball circles @ wall - cw/ccw 20x4 ea            Prone rhomboids 1#  3x10            Prone middle traps 1#  3x10            Manual rhythmic stabilization (FF/Ext, Horis Abd/Add @ 90 degrees flexion) 3x10            Prone rows NP            SA punchups 6#  2x20            SL shoulder flexion to 90 degrees 1# 2x15            Tband ER Green  3x10            AROM scaption 2#  2x10            Body blade  H/V @ 90 degrees flexion  (small) 10"x3 ea                                                                                          Modalities             Cryo R shoulder 10 min post

## 2019-08-08 ENCOUNTER — OFFICE VISIT (OUTPATIENT)
Dept: PHYSICAL THERAPY | Facility: CLINIC | Age: 79
End: 2019-08-08
Payer: MEDICARE

## 2019-08-08 DIAGNOSIS — M75.121 COMPLETE TEAR OF RIGHT ROTATOR CUFF, UNSPECIFIED WHETHER TRAUMATIC: Primary | ICD-10-CM

## 2019-08-08 PROCEDURE — 97112 NEUROMUSCULAR REEDUCATION: CPT | Performed by: PHYSICAL THERAPIST

## 2019-08-08 PROCEDURE — 97110 THERAPEUTIC EXERCISES: CPT | Performed by: PHYSICAL THERAPIST

## 2019-08-08 PROCEDURE — 97140 MANUAL THERAPY 1/> REGIONS: CPT | Performed by: PHYSICAL THERAPIST

## 2019-08-08 NOTE — PROGRESS NOTES
Daily Note     Today's date: 2019  Patient name: Kendrick Velazquez  : 1940  MRN: [id number]  Referring provider: Dionne Gupta  Dx:   Encounter Diagnosis     ICD-10-CM    1  Complete tear of right rotator cuff, unspecified whether traumatic M75 121                   Subjective: Pt reports his shoulder is feeling okay, wishes he could return to fly fishing  Objective: See treatment diary below  Progressed some resistance for RTC and scapular strengthening below  Assessment: Pt able to progress in RTC strengthening exercises this session  Demonstrated moderate fatigue after exercise and completed exercise  With correct technique  Pt would benefit from continued PT services to reduce pain and increase level of function  Plan: Continue per plan of care            Precautions: none    Daily Treatment Diary   Manuals            PROM R shoulder ER/IR (gentle) EH MM                                                  Exercise Diary              UBE 3 min ea  L4 3' ea           Tband Rows with step back Black  3x10 Black, 3x10           Tband Extension with step back Black  3x10 Black, 3x10           Tband IR Blue  3x10 Blue, 3x10           Submax mando ER NP            No band robbery WS  Yellow  2x10 WS yellow, 2x10           Ball circles @ wall - cw/ccw 20x4 ea 20x4 each           Prone rhomboids 1#  3x10 2#, 3x10           Prone middle traps 1#  3x10 2#, 3x10           Manual rhythmic stabilization (FF/Ext, Horis Abd/Add @ 90 degrees flexion) 3x10 3x10           Prone rows NP            SA punchups 6#  2x20 7#, 2x20           SL shoulder flexion to 90 degrees 1# 2x15 2#, 2x15           Tband ER Green  3x10 Green, 3x15           AROM scaption 2#  2x10 2#, 3x10           Body blade  H/V @ 90 degrees flexion  (small) 10"x3 ea 10" x 3 each           Serratus punch @ 135 flexion  Red webslide, 2x15                                                                            Modalities Cryo R shoulder 10 min post 10' post

## 2019-08-12 ENCOUNTER — OFFICE VISIT (OUTPATIENT)
Dept: PHYSICAL THERAPY | Facility: CLINIC | Age: 79
End: 2019-08-12
Payer: MEDICARE

## 2019-08-12 DIAGNOSIS — M75.121 COMPLETE TEAR OF RIGHT ROTATOR CUFF, UNSPECIFIED WHETHER TRAUMATIC: Primary | ICD-10-CM

## 2019-08-12 PROCEDURE — 97140 MANUAL THERAPY 1/> REGIONS: CPT | Performed by: PHYSICAL THERAPIST

## 2019-08-12 PROCEDURE — 97110 THERAPEUTIC EXERCISES: CPT | Performed by: PHYSICAL THERAPIST

## 2019-08-12 NOTE — PROGRESS NOTES
Daily Note     Today's date: 2019  Patient name: Ivet Swift  : 1940  MRN: [id number]  Referring provider: Nayeli De Leon  Dx:   Encounter Diagnosis     ICD-10-CM    1  Complete tear of right rotator cuff, unspecified whether traumatic M75 121                   Subjective: Pt states that his shoulder is feeling sore as he was using it a lot over the weekend  Objective: See treatment diary below  Progressed some resistance for RTC and scapular strengthening below  Assessment: Pt completed exercise with correct technique and demonstrated moderate fatigue after exercise  Able to progress in RTC strengthening this session  Pt would benefit from continued skilled PT services to reduce pain and increase level of function  Plan: Continue per plan of care            Precautions: none    Daily Treatment Diary   Manuals           PROM R shoulder ER/IR (gentle) EH MM MM                                                 Exercise Diary              UBE 3 min ea  L4 3' ea 3' each, L4          Tband Rows with step back Black  3x10 Black, 3x10 Black, 3x10          Tband Extension with step back Black  3x10 Black, 3x10 Black, 3x10          Tband IR Blue  3x10 Blue, 3x10 Blue, 3x10          Submax mando ER NP            No band robbery WS  Yellow  2x10 WS yellow, 2x10 WS yellow, 2x15          Ball circles @ wall - cw/ccw 20x4 ea 20x4 each 20x4 each          Prone rhomboids 1#  3x10 2#, 3x10 3#, 3x10          Prone middle traps 1#  3x10 2#, 3x10 and lowe trap, 3#, 3x10          Manual rhythmic stabilization (FF/Ext, Horis Abd/Add @ 90 degrees flexion) 3x10 3x10 3x10          Prone rows NP            SA punchups 6#  2x20 7#, 2x20 8#, 2x20          SL shoulder flexion to 90 degrees 1# 2x15 2#, 2x15 2#, 2x15          Tband ER Green  3x10 Green, 3x15 Green, 3x15          AROM scaption 2#  2x10 2#, 3x10 3#, 3x10          Body blade  H/V @ 90 degrees flexion  (small) 10"x3 ea 10" x 3 each 10"x3 each          Serratus punch @ 135 flexion  Red farheen, 2x15 Green WS, 2x15x3"                                                                           Modalities             Cryo R shoulder 10 min post 10' post 10' post

## 2019-08-14 ENCOUNTER — OFFICE VISIT (OUTPATIENT)
Dept: PHYSICAL THERAPY | Facility: CLINIC | Age: 79
End: 2019-08-14
Payer: MEDICARE

## 2019-08-14 DIAGNOSIS — M75.121 COMPLETE TEAR OF RIGHT ROTATOR CUFF, UNSPECIFIED WHETHER TRAUMATIC: Primary | ICD-10-CM

## 2019-08-14 PROCEDURE — 97110 THERAPEUTIC EXERCISES: CPT | Performed by: PHYSICAL THERAPIST

## 2019-08-14 PROCEDURE — 97140 MANUAL THERAPY 1/> REGIONS: CPT | Performed by: PHYSICAL THERAPIST

## 2019-08-14 NOTE — PROGRESS NOTES
Daily Note     Today's date: 2019  Patient name: Kendrick Velazquez  : 1940  MRN: [id number]  Referring provider: Dionne Gupta  Dx:   Encounter Diagnosis     ICD-10-CM    1  Complete tear of right rotator cuff, unspecified whether traumatic M75 121                   Subjective: 'I'm still waiting for the pain to go away, but it's getting better '       Objective: See treatment diary below  Assessment: Completed exercise with correct technique and demonstrated moderate fatigue after exercise  KT applied to posterior shoulder to attempt to relieve pain  Pt would benefit from continued skilled PT services to reduce pain and increase level of function  Plan: Continue per plan of care            Precautions: none    Daily Treatment Diary   Manuals          PROM R shoulder ER/IR (gentle) EH MM MM MM                                                Exercise Diary              UBE 3 min ea  L4 3' ea 3' each, L4 3' each, L4         Tband Rows with step back Black  3x10 Black, 3x10 Black, 3x10 Black, 3x10         Tband Extension with step back Black  3x10 Black, 3x10 Black, 3x10 Black, 3x10         Tband IR Blue  3x10 Blue, 3x10 Blue, 3x10 Blue, 3x10         Submax mando ER NP            No band robbery WS  Yellow  2x10 WS yellow, 2x10 WS yellow, 2x15 WS yellow, 2x15         Ball circles @ wall - cw/ccw 20x4 ea 20x4 each 20x4 each 20x4 each         Prone rhomboids 1#  3x10 2#, 3x10 3#, 3x10 3#, 2x15         Prone middle traps 1#  3x10 2#, 3x10 and lowe trap, 3#, 3x10 3#, 2x15         Manual rhythmic stabilization (FF/Ext, Horis Abd/Add @ 90 degrees flexion) 3x10 3x10 3x10 2x30"         Prone rows NP            SA punchups 6#  2x20 7#, 2x20 8#, 2x20 9#, 2x20         SL shoulder flexion to 90 degrees 1# 2x15 2#, 2x15 2#, 2x15 2#, 2x15         Tband ER Green  3x10 Green, 3x15 Green, 3x15 Green, 3x15         AROM scaption 2#  2x10 2#, 3x10 3#, 3x10 3#, 3x10         Body blade  H/V @ 90 degrees flexion  (small) 10"x3 ea 10" x 3 each 10"x3 each 10" x 3 each         Serratus punch @ 135 flexion  Red webslide, 2x15 Green WS, 2x15x3" Green WS, 2x15x3"                                                                          Modalities             Cryo R shoulder 10 min post 10' post 10' post 10' post

## 2019-08-19 ENCOUNTER — OFFICE VISIT (OUTPATIENT)
Dept: PHYSICAL THERAPY | Facility: CLINIC | Age: 79
End: 2019-08-19
Payer: MEDICARE

## 2019-08-19 DIAGNOSIS — M75.121 COMPLETE TEAR OF RIGHT ROTATOR CUFF, UNSPECIFIED WHETHER TRAUMATIC: Primary | ICD-10-CM

## 2019-08-19 PROCEDURE — 97110 THERAPEUTIC EXERCISES: CPT

## 2019-08-19 PROCEDURE — 97140 MANUAL THERAPY 1/> REGIONS: CPT

## 2019-08-19 NOTE — PROGRESS NOTES
Daily Note     Today's date: 2019  Patient name: Rashida Heller  : 1940  MRN: [id number]  Referring provider: Gerard Bailon  Dx:   Encounter Diagnosis     ICD-10-CM    1  Complete tear of right rotator cuff, unspecified whether traumatic M75 121                   Subjective: Patient had no new symptoms or changes to report  Objective: See treatment diary below  Assessment: Continues to progress with strengthening, fatiguing appropriately with RTC and scapular strengthening  Weakness and quick onset of fatigue with Body blade and rhythmic stabilization  Some guarding with PROM ER / IR  Plan: Continue per plan of care            Precautions: none    Daily Treatment Diary   Manuals         PROM R shoulder ER/IR (gentle) EH MM MM MM EH                                               Exercise Diary              UBE 3 min ea  L4 3' ea 3' each, L4 3' each, L4 3 min ea  L4        Tband Rows with step back Black  3x10 Black, 3x10 Black, 3x10 Black, 3x10 Black  3x10        Tband Extension with step back Black  3x10 Black, 3x10 Black, 3x10 Black, 3x10 Black 3x10        Tband IR Blue  3x10 Blue, 3x10 Blue, 3x10 Blue, 3x10 Blue  3x15        Submax mando ER NP            No band robbery WS  Yellow  2x10 WS yellow, 2x10 WS yellow, 2x15 WS yellow, 2x15 WS  Yellow  2x15        Ball circles @ wall - cw/ccw 20x4 ea 20x4 each 20x4 each 20x4 each 20x4 ea        Prone rhomboids 1#  3x10 2#, 3x10 3#, 3x10 3#, 2x15 3#  2x15        Prone middle traps 1#  3x10 2#, 3x10 and lowe trap, 3#, 3x10 3#, 2x15 3#  2x15        Manual rhythmic stabilization (FF/Ext, Horis Abd/Add @ 90 degrees flexion) 3x10 3x10 3x10 2x30" 30"x2 ea        Prone rows NP            SA punchups 6#  2x20 7#, 2x20 8#, 2x20 9#, 2x20 9#  2x20        SL shoulder flexion to 90 degrees 1# 2x15 2#, 2x15 2#, 2x15 2#, 2x15 3#  2x15        Tband ER Green  3x10 Green, 3x15 Green, 3x15 Green, 3x15 Green  3x15        AROM scaption 2#  2x10 2#, 3x10 3#, 3x10 3#, 3x10 3#  3x10        Body blade  H/V @ 90 degrees flexion  (small) 10"x3 ea 10" x 3 each 10"x3 each 10" x 3 each 10"x3 ea        Serratus punch @ 135 flexion  Red webslide, 2x15 Green WS, 2x15x3" Green WS, 2x15x3" WS  Green  3" hold, 2x15                                                                         Modalities             Cryo R shoulder 10 min post 10' post 10' post 10' post 10 min post

## 2019-08-21 ENCOUNTER — OFFICE VISIT (OUTPATIENT)
Dept: PHYSICAL THERAPY | Facility: CLINIC | Age: 79
End: 2019-08-21
Payer: MEDICARE

## 2019-08-21 DIAGNOSIS — M75.121 COMPLETE TEAR OF RIGHT ROTATOR CUFF, UNSPECIFIED WHETHER TRAUMATIC: Primary | ICD-10-CM

## 2019-08-21 PROCEDURE — 97110 THERAPEUTIC EXERCISES: CPT | Performed by: PHYSICAL THERAPIST

## 2019-08-21 PROCEDURE — 97140 MANUAL THERAPY 1/> REGIONS: CPT | Performed by: PHYSICAL THERAPIST

## 2019-08-21 NOTE — PROGRESS NOTES
Daily Note     Today's date: 2019  Patient name: Zari Manriquez  : 1940  MRN: [id number]  Referring provider: Cyndy Martinez  Dx:   Encounter Diagnosis     ICD-10-CM    1  Complete tear of right rotator cuff, unspecified whether traumatic M75 121        Start Time: 945  Stop Time: 1043  Total time in clinic (min): 58 minutes    Subjective: patient reports having injections in his shoulders yesterday, he mentions not having the relief he hoped he mentions being sore yesterday and feeling a little better today  Therapist informed patient that sometimes it can take a day or two for the injection soreness to subside and relief to set in       Objective: See treatment diary below      Assessment: patient tolerated treatment session well  He demonstrates good technique throughout the session  He needs reminders to slow down some movements, able to maintain speed with cuing  Plan: Continue per plan of care  Progress treatment as tolerated               Precautions: none    Daily Treatment Diary   Manuals        PROM R shoulder ER/IR (gentle) EH MM MM MM EH SK                                              Exercise Diary              UBE 3 min ea  L4 3' ea 3' each, L4 3' each, L4 3 min ea  L4 3 min ea L4       Tband Rows with step back Black  3x10 Black, 3x10 Black, 3x10 Black, 3x10 Black  3x10 Black  3x10       Tband Extension with step back Black  3x10 Black, 3x10 Black, 3x10 Black, 3x10 Black 3x10 Black  3x10       Tband IR Blue  3x10 Blue, 3x10 Blue, 3x10 Blue, 3x10 Blue  3x15 Black  3x10       Submax mando ER NP            No band robbery WS  Yellow  2x10 WS yellow, 2x10 WS yellow, 2x15 WS yellow, 2x15 WS  Yellow  2x15 WS  Yellow  2x15       Ball circles @ wall - cw/ccw 20x4 ea 20x4 each 20x4 each 20x4 each 20x4 ea 20x4 ea       Prone rhomboids 1#  3x10 2#, 3x10 3#, 3x10 3#, 2x15 3#  2x15 3#  2x15       Prone middle traps 1#  3x10 2#, 3x10 and lowe trap, 3#, 3x10 3#, 2x15 3#  2x15 3#  2x15        Manual rhythmic stabilization (FF/Ext, Horis Abd/Add @ 90 degrees flexion) 3x10 3x10 3x10 2x30" 30"x2 ea 30"x3 ea       Prone rows NP            SA punchups 6#  2x20 7#, 2x20 8#, 2x20 9#, 2x20 9#  2x20 9#  2x20        SL shoulder flexion to 90 degrees 1# 2x15 2#, 2x15 2#, 2x15 2#, 2x15 3#  2x15 3#  2x15        Tband ER Green  3x10 Green, 3x15 Green, 3x15 Green, 3x15 Green  3x15 Blue  3x15       AROM scaption 2#  2x10 2#, 3x10 3#, 3x10 3#, 3x10 3#  3x10 3#  3x10        Body blade  H/V @ 90 degrees flexion  (small) 10"x3 ea 10" x 3 each 10"x3 each 10" x 3 each 10"x3 ea 10"x3 ea        Serratus punch @ 135 flexion  Red webslide, 2x15 Green WS, 2x15x3" Green WS, 2x15x3" WS  Green  3" hold, 2x15 WS  Green  3" hold, 2x15                                                                         Modalities             Cryo R shoulder 10 min post 10' post 10' post 10' post 10 min post 10 min post

## 2019-08-26 ENCOUNTER — OFFICE VISIT (OUTPATIENT)
Dept: PHYSICAL THERAPY | Facility: CLINIC | Age: 79
End: 2019-08-26
Payer: MEDICARE

## 2019-08-26 DIAGNOSIS — M75.121 COMPLETE TEAR OF RIGHT ROTATOR CUFF, UNSPECIFIED WHETHER TRAUMATIC: Primary | ICD-10-CM

## 2019-08-26 PROCEDURE — 97110 THERAPEUTIC EXERCISES: CPT

## 2019-08-26 PROCEDURE — 97140 MANUAL THERAPY 1/> REGIONS: CPT

## 2019-08-26 NOTE — PROGRESS NOTES
Daily Note     Today's date: 2019  Patient name: Josue Mckeon  : 1940  MRN: [id number]  Referring provider: Mika Phan  Dx:   Encounter Diagnosis     ICD-10-CM    1  Complete tear of right rotator cuff, unspecified whether traumatic M75 121          Subjective: Patient noted that he had increased pain in R forearm yesterday no pain in shoulder  Patient noted residual R  achy pain from forearm yesterday still present today  Patient noted that he casted for 15 minutes with his fishing pole over the weekend with no pain in his R shoulder  Objective: See treatment diary below      Assessment: Tolerated treatment well  Patient performed serratus punches with gray band on Web slide today, noting that gray band was ok  minimal pain noted with serratus punches with TB in proximal R arm  Patient demonstrated fatigue post treatment, exhibited good technique with therapeutic exercises and would benefit from continued PT      Plan: Continue per plan of care              Precautions: none    Daily Treatment Diary   Manuals       PROM R shoulder ER/IR (gentle) EH MM MM MM EH SK af                                             Exercise Diary              UBE 3 min ea  L4 3' ea 3' each, L4 3' each, L4 3 min ea  L4 3 min ea L4 3 min ea L5      Tband Rows with step back Black  3x10 Black, 3x10 Black, 3x10 Black, 3x10 Black  3x10 Black  3x10 Black 3x10      Tband Extension with step back Black  3x10 Black, 3x10 Black, 3x10 Black, 3x10 Black 3x10 Black  3x10 Black 3x10      Tband IR Blue  3x10 Blue, 3x10 Blue, 3x10 Blue, 3x10 Blue  3x15 Black  3x10 Black 3x10      Submax mando ER NP            No band robbery WS  Yellow  2x10 WS yellow, 2x10 WS yellow, 2x15 WS yellow, 2x15 WS  Yellow  2x15 WS  Yellow  2x15 WS yellow 2x15      Ball circles @ wall - cw/ccw 20x4 ea 20x4 each 20x4 each 20x4 each 20x4 ea 20x4 ea 20 x4 ea      Prone rhomboids 1#  3x10 2#, 3x10 3#, 3x10 3#, 2x15 3#  2x15 3#  2x15 3# 2x15      Prone middle traps 1#  3x10 2#, 3x10 and lowe trap, 3#, 3x10 3#, 2x15 3#  2x15 3#  2x15  3# 2x15      Manual rhythmic stabilization (FF/Ext, Horis Abd/Add @ 90 degrees flexion) 3x10 3x10 3x10 2x30" 30"x2 ea 30"x3 ea 30"x3      Prone rows NP            SA punchups 6#  2x20 7#, 2x20 8#, 2x20 9#, 2x20 9#  2x20 9#  2x20  9# 2x20      SL shoulder flexion to 90 degrees 1# 2x15 2#, 2x15 2#, 2x15 2#, 2x15 3#  2x15 3#  2x15  3# 3x10      Tband ER Green  3x10 Green, 3x15 Green, 3x15 Green, 3x15 Green  3x15 Blue  3x15 Green 3x15      AROM scaption 2#  2x10 2#, 3x10 3#, 3x10 3#, 3x10 3#  3x10 3#  3x10  nv      Body blade  H/V @ 90 degrees flexion  (small) 10"x3 ea 10" x 3 each 10"x3 each 10" x 3 each 10"x3 ea 10"x3 ea  10"x3 ea      Serratus punch @ 135 flexion  Red webslide, 2x15 Green WS, 2x15x3" Green WS, 2x15x3" WS  Green  3" hold, 2x15 WS  Green  3" hold, 2x15  WS gray 3" hold 2x15                                                                       Modalities             Cryo R shoulder 10 min post 10' post 10' post 10' post 10 min post 10 min post 10 min post

## 2019-08-29 ENCOUNTER — OFFICE VISIT (OUTPATIENT)
Dept: PHYSICAL THERAPY | Facility: CLINIC | Age: 79
End: 2019-08-29
Payer: MEDICARE

## 2019-08-29 DIAGNOSIS — M75.121 COMPLETE TEAR OF RIGHT ROTATOR CUFF, UNSPECIFIED WHETHER TRAUMATIC: Primary | ICD-10-CM

## 2019-08-29 PROCEDURE — 97140 MANUAL THERAPY 1/> REGIONS: CPT

## 2019-08-29 PROCEDURE — 97110 THERAPEUTIC EXERCISES: CPT

## 2019-08-29 PROCEDURE — 97112 NEUROMUSCULAR REEDUCATION: CPT

## 2019-08-29 NOTE — PROGRESS NOTES
Daily Note     Today's date: 2019  Patient name: Conner Oneill  : 1940  MRN: [id number]  Referring provider: Kerri Goss  Dx:   Encounter Diagnosis     ICD-10-CM    1  Complete tear of right rotator cuff, unspecified whether traumatic M75 121          Subjective: Patient reported his R shoulder is "not there yet" but is no worse  Forearm pain he noted last treatment has subsided  Objective: See treatment diary below  Assessment: Has a good understanding of his current program, demonstrating improved shoulder and scapular strength and stabilization  Less fatigue noted with Bodyblade  ER and IR motion has also improved, at times, guarded during motion  Plan: Continue per plan of care            Precautions: none    Daily Treatment Diary   Manuals      PROM R shoulder ER/IR (gentle) EH MM MM MM EH SK af EH                                            Exercise Diary              UBE 3 min ea  L4 3' ea 3' each, L4 3' each, L4 3 min ea  L4 3 min ea L4 3 min ea L5 3 min ea   L5     Tband Rows with step back Black  3x10 Black, 3x10 Black, 3x10 Black, 3x10 Black  3x10 Black  3x10 Black 3x10 Purple  3x10     Tband Extension with step back Black  3x10 Black, 3x10 Black, 3x10 Black, 3x10 Black 3x10 Black  3x10 Black 3x10 Purple 3x10     Tband IR Blue  3x10 Blue, 3x10 Blue, 3x10 Blue, 3x10 Blue  3x15 Black  3x10 Black 3x10 Purple 3x10      Submax mando ER NP            No band robbery WS  Yellow  2x10 WS yellow, 2x10 WS yellow, 2x15 WS yellow, 2x15 WS  Yellow  2x15 WS  Yellow  2x15 WS yellow 2x15 WS Yellow  2x15     Ball circles @ wall - cw/ccw 20x4 ea 20x4 each 20x4 each 20x4 each 20x4 ea 20x4 ea 20 x4 ea 20x4 ea     Prone rhomboids 1#  3x10 2#, 3x10 3#, 3x10 3#, 2x15 3#  2x15 3#  2x15 3# 2x15 3#  2x15     Prone middle traps 1#  3x10 2#, 3x10 and lowe trap, 3#, 3x10 3#, 2x15 3#  2x15 3#  2x15  3# 2x15 3#  2x15     Manual rhythmic stabilization (FF/Ext, Horiz Abd/Add @ 90 degrees flexion) 3x10 3x10 3x10 2x30" 30"x2 ea 30"x3 ea 30"x3 30"x3 ea     Prone rows NP            SA punchups 6#  2x20 7#, 2x20 8#, 2x20 9#, 2x20 9#  2x20 9#  2x20  9# 2x20 9#  2x20     SL shoulder flexion to 90 degrees 1# 2x15 2#, 2x15 2#, 2x15 2#, 2x15 3#  2x15 3#  2x15  3# 3x10 3#  3x10     Tband ER Green  3x10 Green, 3x15 Green, 3x15 Green, 3x15 Green  3x15 Blue  3x15 Green 3x15 Blue  3x15     AROM scaption 2#  2x10 2#, 3x10 3#, 3x10 3#, 3x10 3#  3x10 3#  3x10  nv NV     Body blade  H/V @ 90 degrees flexion (yellow) 10"x3 ea 10" x 3 each 10"x3 each 10" x 3 each 10"x3 ea 10"x3 ea  10"x3 ea 10"x3 ea     Serratus punch @ 135 flexion webslide  Red webslide, 2x15 Green WS, 2x15x3" Green WS, 2x15x3" WS  Green  3" hold, 2x15 WS  Green  3" hold, 2x15  WS gray 3" hold 2x15 Carron Fail  3" hold, 2x15                                                                      Modalities             Cryo R shoulder 10 min post 10' post 10' post 10' post 10 min post 10 min post 10 min post 10 min post

## 2019-09-03 ENCOUNTER — OFFICE VISIT (OUTPATIENT)
Dept: PHYSICAL THERAPY | Facility: CLINIC | Age: 79
End: 2019-09-03
Payer: MEDICARE

## 2019-09-03 DIAGNOSIS — M75.121 COMPLETE TEAR OF RIGHT ROTATOR CUFF, UNSPECIFIED WHETHER TRAUMATIC: Primary | ICD-10-CM

## 2019-09-03 PROCEDURE — 97110 THERAPEUTIC EXERCISES: CPT

## 2019-09-03 PROCEDURE — 97112 NEUROMUSCULAR REEDUCATION: CPT

## 2019-09-03 PROCEDURE — 97140 MANUAL THERAPY 1/> REGIONS: CPT

## 2019-09-03 NOTE — PROGRESS NOTES
Daily Note     Today's date: 9/3/2019  Patient name: Miracle Chaparro  : 1940  MRN: [id number]  Referring provider: Aarti Hoffmann  Dx:   Encounter Diagnosis     ICD-10-CM    1  Complete tear of right rotator cuff, unspecified whether traumatic M75 121          Subjective: Patient reported he is being "cautiously optimistic" but R shoulder has been feeling good  Considering trying fishing this coming weekend if symptoms remain minimal       Objective: See treatment diary below  Assessment: Fatigues with current strengthening program  Cued for control on return during AROM scaption  Decreased guarding today during PROM  Continued fatigue with rhythmic stabilization  Plan: Continue per plan of care            Precautions: none    Daily Treatment Diary   Manuals 8/5 8/8 8/12 8/14 8/19 8/21 8/26 8/29 9/3    PROM R shoulder ER/IR (gentle) EH MM MM MM EH SK af [facility] EH                                           Exercise Diary              UBE 3 min ea  L4 3' ea 3' each, L4 3' each, L4 3 min ea  L4 3 min ea L4 3 min ea L5 3 min ea   L5 3 min ea  L5    Tband Rows with step back Black  3x10 Black, 3x10 Black, 3x10 Black, 3x10 Black  3x10 Black  3x10 Black 3x10 Purple  3x10 Purple  3x10    Tband Extension with step back Black  3x10 Black, 3x10 Black, 3x10 Black, 3x10 Black 3x10 Black  3x10 Black 3x10 Purple 3x10 Purple  3x10    Tband IR Blue  3x10 Blue, 3x10 Blue, 3x10 Blue, 3x10 Blue  3x15 Black  3x10 Black 3x10 Purple 3x10  Purple  3x10    Submax mando ER NP            No band robbery WS  Yellow  2x10 WS yellow, 2x10 WS yellow, 2x15 WS yellow, 2x15 WS  Yellow  2x15 WS  Yellow  2x15 WS yellow 2x15 WS Yellow  2x15 WS Red  2x15    Ball circles @ wall - cw/ccw 20x4 ea 20x4 each 20x4 each 20x4 each 20x4 ea 20x4 ea 20 x4 ea 20x4 ea 20x4 ea    Prone rhomboids 1#  3x10 2#, 3x10 3#, 3x10 3#, 2x15 3#  2x15 3#  2x15 3# 2x15 3#  2x15 3#  2x15    Prone middle traps 1#  3x10 2#, 3x10 and lowe trap, 3#, 3x10 3#, 2x15 3#  2x15 3#  2x15  3# 2x15 3#  2x15 3#  2x15    Manual rhythmic stabilization (FF/Ext, Horiz Abd/Add @ 90 degrees flexion) 3x10 3x10 3x10 2x30" 30"x2 ea 30"x3 ea 30"x3 30"x3 ea 30"x3 ea    Prone rows NP            SA punchups 6#  2x20 7#, 2x20 8#, 2x20 9#, 2x20 9#  2x20 9#  2x20  9# 2x20 9#  2x20 9#  2x20    SL shoulder flexion to 90 degrees 1# 2x15 2#, 2x15 2#, 2x15 2#, 2x15 3#  2x15 3#  2x15  3# 3x10 3#  3x10 3#  2x15    Tband ER Green  3x10 Green, 3x15 Green, 3x15 Green, 3x15 Green  3x15 Blue  3x15 Green 3x15 Blue  3x15 Blue  3x15    AROM scaption 2#  2x10 2#, 3x10 3#, 3x10 3#, 3x10 3#  3x10 3#  3x10  nv NV 3#  3x10    Body blade  H/V @ 90 degrees flexion (yellow) 10"x3 ea 10" x 3 each 10"x3 each 10" x 3 each 10"x3 ea 10"x3 ea  10"x3 ea 10"x3 ea 10"x3 ea    Serratus punch @ 135 flexion webslide  Red webslide, 2x15 Green WS, 2x15x3" Green WS, 2x15x3" WS  Green  3" hold, 2x15 WS  Green  3" hold, 2x15  WS gray 3" hold 2x15 Sydney Hensen  3" hold, 2x15 Sydney Hensen  3" hold, 2x15                                                                     Modalities             Cryo R shoulder 10 min post 10' post 10' post 10' post 10 min post 10 min post 10 min post 10 min post 10 min post

## 2019-09-06 ENCOUNTER — OFFICE VISIT (OUTPATIENT)
Dept: PHYSICAL THERAPY | Facility: CLINIC | Age: 79
End: 2019-09-06
Payer: MEDICARE

## 2019-09-06 ENCOUNTER — TRANSCRIBE ORDERS (OUTPATIENT)
Dept: PHYSICAL THERAPY | Facility: CLINIC | Age: 79
End: 2019-09-06

## 2019-09-06 DIAGNOSIS — M75.121 COMPLETE TEAR OF RIGHT ROTATOR CUFF, UNSPECIFIED WHETHER TRAUMATIC: Primary | ICD-10-CM

## 2019-09-06 PROCEDURE — 97112 NEUROMUSCULAR REEDUCATION: CPT | Performed by: PHYSICAL THERAPIST

## 2019-09-06 PROCEDURE — 97110 THERAPEUTIC EXERCISES: CPT | Performed by: PHYSICAL THERAPIST

## 2019-09-06 PROCEDURE — 97140 MANUAL THERAPY 1/> REGIONS: CPT | Performed by: PHYSICAL THERAPIST

## 2019-09-06 NOTE — PROGRESS NOTES
Progress Note     Today's date: 2019  Patient name: Loren Tafoya  : 1940  MRN: [id number]  Referring provider: Fabrice Noriega  Dx:   Encounter Diagnosis     ICD-10-CM    1  Complete tear of right rotator cuff, unspecified whether traumatic M75 121          Assessment: Patient is a 78 y o  male who  presents with  a diagnosis of a R rotator cuff tear  PMH significant for a previous repair about 10 years ago  Trying to avoid TSA   Over the past month pt has made the following progress towards goals:  1) Decreased pain  2) Improved range of motion  3) Improved strength  4) Improved self rated functional score  (improved to 72 from 59 at time of IE)    Now making good progress, still with functional limitations as a result of pain and  weakness   Recommend continued short course of therapy, see updated goals below  Understanding of Dx/Px/POC: excellent  Goals  Short Term Goals:  1) Pain : Decrease R shoulder pain to 3/10 at worst x 1 continuous week within 3-4weeks  -met  2) AROM: Improve AROM by at least , 6 degrees ER and 1vertebral levels for IR within 3-4weeks  -met  3) Strength: Improved UE strength by at least 1/2 grade for all noted as weak within 3-4 weeks  -met  4) Function: Improved FOTO score from IE within 3-4 weeks, patient to note greater use of arm for ADLs  -met    LongTerm Goals:   1) Pain : Decrease R shoulder pain to 1/10 at worst x 1 continuous week within 6-8 weeks  -not met  2) AROM: Improve AROM to at least 10 degrees ER and to T7 level for IR within 6-8 weeks  -met  3) Strength: Improved UE strength by at leas 1 grade within 6-8 weeks  -partially met  4) Function: Improved FOTO score to at least 72 ; no reported difficulty with ADLs  -partially met  5) (I) with HEP-not met       Plan  Patient would benefit from: skilled PT  Planned modality interventions: cryotherapy, TENS, thermotherapy: hydrocollator packs and ultrasound  Planned therapy interventions: ADL training, body mechanics training, functional ROM exercises, home exercise program, joint mobilization, manual therapy, neuromuscular re-education, patient education, postural training, strengthening, stretching, therapeutic activities and therapeutic exercise  Frequency: 2-3 x's per week x 3-4 weeks  Treatment plan discussed with: patient              Subjective: "It's getting better " Continues to note improving R shoulder strength and motion  Has been practicing some casting at home and it is going well  Plans to try to return to fishing next week  Still notes some lateral R shoulder pain with lifting and reaching activities  Pain level 3/10 at worst over the past week  Objective: See treatment diary below        Active Range of Motion     Right Shoulder   Flexion: 170 degrees with pain  Abduction: 165 degrees with pain  External rotation 90°: 80 degrees  Internal rotation BTB: T7 with pain     Passive Range of Motion         Strength/Myotome Testing     Right Shoulder      Planes of Motion   Flexion: 5  Extension: 5   Abduction: 4+  Adduction: 5   External rotation at 0°: 4  Internal rotation at 0°: 5     Isolated Muscles   Biceps: 5   Lower trapezius: 4  Rhomboids: 4  Serratus anterior: 5   Triceps: 5      Tests      Right Shoulder   Positive empty can  Negative drop arm, external rotation lag sign and lift-off  Assessment: Since starting therapy pt has made the following progress towards goals:  1) Decreased pain  2) Improved range of motion  3) Improved strength  4) Improved self rated functional score  (FOTO score improved to 72 from 59 at time of initial visit)    Making good progress overall  He is still hoping to avoid TSA  Recommend continued therapy with focus on functional strengthening  Plan: Continue per plan of care              Precautions: none       Precautions: none     Daily Treatment Diary   Manuals 8/5 8/8 8/12 8/14 8/19 8/21 8/26 8/29 9/3 9/5   PROM R shoulder ER/IR (gentle) EH MM MM MM EH SK af EH EH  RG                                                                           Exercise Diary                        UBE 3 min ea  L4 3' ea 3' each, L4 3' each, L4 3 min ea  L4 3 min ea L4 3 min ea L5 3 min ea   L5 3 min ea  L5 3 3  L5   Tband Rows with step back Black  3x10 Black, 3x10 Black, 3x10 Black, 3x10 Black  3x10 Black  3x10 Black 3x10 Purple  3x10 Purple  3x10  Purple  X 30   Tband Extension with step back Black  3x10 Black, 3x10 Black, 3x10 Black, 3x10 Black 3x10 Black  3x10 Black 3x10 Purple 3x10 Purple  3x10  Purple  X 30   Tband IR Blue  3x10 Blue, 3x10 Blue, 3x10 Blue, 3x10 Blue  3x15 Black  3x10 Black 3x10 Purple 3x10  Purple  3x10 Purple  X 30   Submax mando ER NP                     No band robbery WS  Yellow  2x10 WS yellow, 2x10 WS yellow, 2x15 WS yellow, 2x15 WS  Yellow  2x15 WS  Yellow  2x15 WS yellow 2x15 WS Yellow  2x15 WS Red  2x15 Webslide  Red   2 x 15   Ball circles @ wall - cw/ccw 20x4 ea 20x4 each 20x4 each 20x4 each 20x4 ea 20x4 ea 20 x4 ea 20x4 ea 20x4 ea  20cw/ccw x 4   Prone rhomboids 1#  3x10 2#, 3x10 3#, 3x10 3#, 2x15 3#  2x15 3#  2x15 3# 2x15 3#  2x15 3#  2x15  3#  2 x 15   Prone middle traps 1#  3x10 2#, 3x10 and lowe trap, 3#, 3x10 3#, 2x15 3#  2x15 3#  2x15  3# 2x15 3#  2x15 3#  2x15  3#  2 x 15   Manual rhythmic stabilization (FF/Ext, Horiz Abd/Add @ 90 degrees flexion) 3x10 3x10 3x10 2x30" 30"x2 ea 30"x3 ea 30"x3 30"x3 ea 30"x3 ea  30sec x 3   Prone rows NP                     SA punchups 6#  2x20 7#, 2x20 8#, 2x20 9#, 2x20 9#  2x20 9#  2x20  9# 2x20 9#  2x20 9#  2x20  10#  2 x 20   SL shoulder flexion to 90 degrees 1# 2x15 2#, 2x15 2#, 2x15 2#, 2x15 3#  2x15 3#  2x15  3# 3x10 3#  3x10 3#  2x15  33  2 x 15   Tband ER Green  3x10 Green, 3x15 Green, 3x15 Green, 3x15 Green  3x15 Blue  3x15 Green 3x15 Blue  3x15 Blue  3x15  Blue  3 x 15   AROM scaption 2#  2x10 2#, 3x10 3#, 3x10 3#, 3x10 3#  3x10 3#  3x10  nv NV 3#  3x10 3#  3 x 10   Body blade  H/V @ 90 degrees flexion (yellow) 10"x3 ea 10" x 3 each 10"x3 each 10" x 3 each 10"x3 ea 10"x3 ea  10"x3 ea 10"x3 ea 10"x3 ea  10sec x 3 each   Serratus punch @ 135 flexion webslide   Red webslide, 2x15 Green WS, 2x15x3" Green WS, 2x15x3" WS  Green  3" hold, 2x15 WS  Green  3" hold, 2x15  WS gray 3" hold 2x15 Maritza Candida  3" hold, 2x15 Maritza Arroyo  3" hold, 2x15  Webslide  Black  3sec  2 x 15    Kesier PNF D2 flexion                     7  2 x 10   Princess PNF D2 extension                    9 5  2 x 10                                                                           Modalities                       Cryo R shoulder 10 min post 8' post 10' post 10' post 10 min post 10 min post 10 min post 10 min post 10 min post  10 min post

## 2019-09-06 NOTE — LETTER
2019    Rupali Campbell PA-C  [facility]  [facility]    Patient: Kevyn Ovalle   YOB: 1940   Date of Visit: 2019     Encounter Diagnosis     ICD-10-CM    1  Complete tear of right rotator cuff, unspecified whether traumatic M75 121        Dear Dr Esther Palumbo:    Thank you for your recent referral of Kevyn Ovalle  Please review the attached evaluation summary from Zhen's recent visit  Please verify that you agree with the plan of care by signing the attached order  If you have any questions or concerns, please do not hesitate to call  I sincerely appreciate the opportunity to share in the care of one of your patients and hope to have another opportunity to work with you in the near future  Sincerely,    Rick Leon, PT      Referring Provider:      I certify that I have read the below Plan of Care and certify the need for these services furnished under this plan of treatment while under my care  Rupali Campbell PA-C  [facility]  [address] [place] [address]: [phone]          Progress Note     Today's date: 2019  Patient name: Kevyn Ovlale  : 1940  MRN: [id number]  Referring provider: Gracie Kemp  Dx:   Encounter Diagnosis     ICD-10-CM    1  Complete tear of right rotator cuff, unspecified whether traumatic M75 121          Assessment: Patient is a 78 y o  male who  presents with  a diagnosis of a R rotator cuff tear  PMH significant for a previous repair about 10 years ago  Trying to avoid TSA   Over the past month pt has made the following progress towards goals:  1) Decreased pain  2) Improved range of motion  3) Improved strength  4) Improved self rated functional score  (improved to 72 from 59 at time of IE)    Now making good progress, still with functional limitations as a result of pain and  weakness    Recommend continued short course of therapy, see updated goals below  Understanding of Dx/Px/POC: excellent  Goals  Short Term Goals:  1) Pain : Decrease R shoulder pain to 3/10 at worst x 1 continuous week within 3-4weeks  -met  2) AROM: Improve AROM by at least , 6 degrees ER and 1vertebral levels for IR within 3-4weeks  -met  3) Strength: Improved UE strength by at least 1/2 grade for all noted as weak within 3-4 weeks  -met  4) Function: Improved FOTO score from IE within 3-4 weeks, patient to note greater use of arm for ADLs  -met    LongTerm Goals:   1) Pain : Decrease R shoulder pain to 1/10 at worst x 1 continuous week within 6-8 weeks  -not met  2) AROM: Improve AROM to at least 10 degrees ER and to T7 level for IR within 6-8 weeks  -met  3) Strength: Improved UE strength by at leas 1 grade within 6-8 weeks  -partially met  4) Function: Improved FOTO score to at least 72 ; no reported difficulty with ADLs  -partially met  5) (I) with HEP-not met       Plan  Patient would benefit from: skilled PT  Planned modality interventions: cryotherapy, TENS, thermotherapy: hydrocollator packs and ultrasound  Planned therapy interventions: ADL training, body mechanics training, functional ROM exercises, home exercise program, joint mobilization, manual therapy, neuromuscular re-education, patient education, postural training, strengthening, stretching, therapeutic activities and therapeutic exercise  Frequency: 2-3 x's per week x 3-4 weeks  Treatment plan discussed with: patient              Subjective: "It's getting better " Continues to note improving R shoulder strength and motion  Has been practicing some casting at home and it is going well  Plans to try to return to fishing next week  Still notes some lateral R shoulder pain with lifting and reaching activities  Pain level 3/10 at worst over the past week      Objective: See treatment diary below        Active Range of Motion     Right Shoulder   Flexion: 1 70 degrees with pain  Abduction: 165 degrees with pain  External rotation 90°: 80 degrees  Internal rotation BTB: T7 with pain     Passive Range of Motion         Strength/Myotome Testing     Right Shoulder      Planes of Motion   Flexion:  5  Extension: 5   Abduction: 4 +  Adduction: 5   External rotation at 0°: 4  Internal rotation at 0°: 5     Isolated Muscles   Biceps: 5   Lower trapezius: 4  Rhomboids: 4  Serratus anterior: 5   Triceps: 5      Tests      Right Shoulder   Positive empty can  Negative drop arm, external rotation lag sign and lift-off  Assessment: Since starting therapy pt has made the following progress towards goals:  1) Decreased pain  2) Improved range of motion  3) Improved strength  4) Improved self rated functional score  (FOTO score improved to 72 from 59 at time of initial visit)    Making good progress overall  He is still hoping to avoid TSA  Recommend continued therapy with focus on functional strengthening  Plan: Continue per plan of care              Precautions: none       Precautions: none     Daily Treatment Diary   Manuals 8/5 8/8 8/12 8/14 8/19 8/21 8/26 8/29 9/3 9/5   PROM R shoulder ER/IR (gentle) EH MM MM MM EH SK af EH EH  RG                                                                           Exercise Diary                        UBE 3 min ea  L4 3' ea 3' each, L4 3' each, L4 3 min ea  L4 3 min ea L4 3 min ea L5 3 min ea   L5 3 min ea  L5 3 3  L5   Tband Rows with step back Black  3x10 Black, 3x10 Black, 3x10 Black, 3x10 Black  3x10 Black  3x10 Black 3x10 Purple  3x10 Purple  3x10  Purple  X 30   Tband Extension with step back Black  3x10 Black, 3x10 Black, 3x10 Black, 3x10 Black 3x10 Black  3x10 Black 3x10 Purple 3x10 Purple  3x10  Purple  X 30   Tband IR Blue  3x10 Blue, 3x10 Blue, 3x10 Blue, 3x10 Blue  3x15 Black  3x10 Black 3x10 Purple 3x10  Purple  3x10 Purple  X 30   Submax mando ER NP                     No band robbery WS  Yellow  2x10 WS yellow, 2x10 WS yellow, 2x15 WS yellow, 2x15 WS  Yellow  2x15 WS  Yellow  2x15 WS yellow 2x15 WS Yellow  2x15 WS Red  2x15 Webslide  Red   2 x 15   Ball circles @ wall - cw/ccw 20x4 ea 20x4 each 20x4 each 20x4 each 20x4 ea 20x4 ea 20 x4 ea 20x4 ea 20x4 ea  20cw/ccw x 4   Prone rhomboids 1#  3x10 2#, 3x10 3#, 3x10 3#, 2x15 3#  2x15 3#  2x15 3# 2x15 3#  2x15 3#  2x15  3#  2 x 15   Prone middle traps 1#  3x10 2#, 3x10 and lowe trap, 3#, 3x10 3#, 2x15 3#  2x15 3#  2x15  3# 2x15 3#  2x15 3#  2x15  3#  2 x 15   Manual rhythmic stabilization (FF/Ext, Horiz Abd/Add @ 90 degrees flexion) 3x10 3x10 3x10 2x30" 30"x2 ea 30"x3 ea 30"x3 30"x3 ea 30"x3 ea  30sec x 3   Prone rows NP                     SA punchups 6#  2x20 7#, 2x20 8#, 2x20 9#, 2x20 9#  2x20 9#  2x20  9# 2x20 9#  2x20 9#  2x20  10#  2 x 20   SL shoulder flexion to 90 degrees 1# 2x15 2#, 2x15 2#, 2x15 2#, 2x15 3#  2x15 3#  2x15  3# 3x10 3#  3x10 3#  2x15  33  2 x 15   Tband ER Green  3x10 Green, 3x15 Green, 3x15 Green, 3x15 Green  3x15 Blue  3x15 Green 3x15 Blue  3x15 Blue  3x15  Blue  3 x 15   AROM scaption 2#  2x10 2#, 3x10 3#, 3x10 3#, 3x10 3#  3x10 3#  3x10  nv NV 3#  3x10 3#  3 x 10   Body blade  H/V @ 90 degrees flexion (yellow) 10"x3 ea 10" x 3 each 10"x3 each 10" x 3 each 10"x3 ea 10"x3 ea  10"x3 ea 10"x3 ea 10"x3 ea  10sec x 3 each   Serratus punch @ 135 flexion webslide   Red webslide, 2x15 Green WS, 2x15x3" Green WS, 2x15x3" WS  Green  3" hold, 2x15 WS  Green  3" hold, 2x15  WS gray 3" hold 2x15 Gray  3" hold, 2x15 Gray  3" hold, 2x15  Webslide  Black  3sec  2 x 15    Kesier PNF D2 flexion                     7  2 x 10   [place] PNF D2 extension                    9 5  2 x 10                                                                           Modalities                       Cryo R shoulder 10 min post 8' post 10' post 10' post 10 min post 10 min post 10 min post 10 min post 10 min post  10 min post

## 2019-09-09 ENCOUNTER — APPOINTMENT (OUTPATIENT)
Dept: PHYSICAL THERAPY | Facility: CLINIC | Age: 79
End: 2019-09-09
Payer: MEDICARE

## 2019-09-11 ENCOUNTER — APPOINTMENT (OUTPATIENT)
Dept: PHYSICAL THERAPY | Facility: CLINIC | Age: 79
End: 2019-09-11
Payer: MEDICARE

## 2019-09-12 ENCOUNTER — OFFICE VISIT (OUTPATIENT)
Dept: PHYSICAL THERAPY | Facility: CLINIC | Age: 79
End: 2019-09-12
Payer: MEDICARE

## 2019-09-12 DIAGNOSIS — M75.121 COMPLETE TEAR OF RIGHT ROTATOR CUFF, UNSPECIFIED WHETHER TRAUMATIC: Primary | ICD-10-CM

## 2019-09-12 PROCEDURE — 97140 MANUAL THERAPY 1/> REGIONS: CPT

## 2019-09-12 PROCEDURE — 97110 THERAPEUTIC EXERCISES: CPT

## 2019-09-12 PROCEDURE — 97112 NEUROMUSCULAR REEDUCATION: CPT

## 2019-09-12 NOTE — PROGRESS NOTES
Daily Note     Today's date: 2019  Patient name: Brice Reyes  : 1940  MRN: [id number]  Referring provider: Lowell Almanza  Dx:   Encounter Diagnosis     ICD-10-CM    1  Complete tear of right rotator cuff, unspecified whether traumatic M75 121                   Subjective: Patient was able to fly fish with minimal to no difficulty, if anything feels driving is tougher than fishing now  Objective: See treatment diary below  Assessment: Both subjective and objective improvement in strength  PROM used to maintain functional ER / IR mobility  Plan: Continue per plan of care    Progress treatment as tolerated           Precautions: none     Daily Treatment Diary   Manuals             PROM R shoulder ER/IR (gentle) EH                                                      Exercise Diary              UBE 3 min ea  L5            Tband Rows with step back Purple  x30            Tband Extension with step back Purple  x30            Tband IR Purple  x30            Submax mando ER D/C            No band robbery webslide Red  2x15            Ball circles @ wall - cw/ccw 20x4   ea            Prone rhomboids 3#  2x15            Prone middle traps 3#  2x15            Manual rhythmic stabilization (FF/Ext, Horiz Abd/Add @ 90 degrees flexion) 30"x3 ea            Prone rows NP            SA punchups 10#  2x20            SL shoulder flexion to 90 degrees 3#  2x15            Tband ER Blue  3x15            AROM scaption 3#  3x10            Body blade  H/V @ 90 degrees flexion (yellow) 10"x3 ea            Serratus punch @ 135 flexion webslide Black  3" hold, 2x15             Kesier PNF D2 flexion NV            [place] PNF D2 extension NV                                                         Modalities              Cryo R shoulder 10 min post

## 2019-09-16 ENCOUNTER — OFFICE VISIT (OUTPATIENT)
Dept: PHYSICAL THERAPY | Facility: CLINIC | Age: 79
End: 2019-09-16
Payer: MEDICARE

## 2019-09-16 DIAGNOSIS — M75.121 COMPLETE TEAR OF RIGHT ROTATOR CUFF, UNSPECIFIED WHETHER TRAUMATIC: Primary | ICD-10-CM

## 2019-09-16 PROCEDURE — 97140 MANUAL THERAPY 1/> REGIONS: CPT | Performed by: PHYSICAL THERAPIST

## 2019-09-16 PROCEDURE — 97110 THERAPEUTIC EXERCISES: CPT | Performed by: PHYSICAL THERAPIST

## 2019-09-16 NOTE — PROGRESS NOTES
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 function (driving, sleep, lifting, recreation)  Plan: Continue per plan of care    Progress treatment as tolerated           Precautions: none     Daily Treatment Diary   Manuals            PROM R shoulder ER/IR (gentle) EH RG                                                     Exercise Diary              UBE 3 min ea  L5 3/3  L5           Tband Rows with step back Purple  x30 Purple  X 30           Tband Extension with step back Purple  x30 Purple   x 30           Tband IR Purple  x30 Purple  X  30           Submax mando ER D/C            No band robbery webslide Red  2x15 Black  2 x 10           Ball circles @ wall - cw/ccw 20x4   ea 20cw/ccw x 4           Prone rhomboids 3#  2x15 3#  2 x 15           Prone middle traps 3#  2x15 3#  2 x 15           Manual rhythmic stabilization (FF/Ext, Horiz Abd/Add @ 90 degrees flexion) 30"x3 ea 3 x 10 each way           Prone rows NP            SA punchups 10#  2x20 15#  2 x 20           SL shoulder flexion to 90 degrees 3#  2x15 3#  2 x 15           Tband ER Blue  3x15 Blue  3 x 15           AROM scaption 3#  3x10 3#  3 x 10           Body blade  H/V @ 90 degrees flexion (yellow) 10"x3 ea 10sec x 3 each           Serratus punch @ 135 flexion webslide Black  3" hold, 2x15 Black  3sec  2 x 15            Kesier PNF D2 flexion NV 0 7  2 x 10           Princess PNF D2 extension NV 9 5  2 x 10            SL ER  3#  3 x 10                                          Modalities              Cryo R shoulder 10 min post 10 min

## 2019-09-18 ENCOUNTER — OFFICE VISIT (OUTPATIENT)
Dept: PHYSICAL THERAPY | Facility: CLINIC | Age: 79
End: 2019-09-18
Payer: MEDICARE

## 2019-09-18 DIAGNOSIS — M75.121 COMPLETE TEAR OF RIGHT ROTATOR CUFF, UNSPECIFIED WHETHER TRAUMATIC: Primary | ICD-10-CM

## 2019-09-18 PROCEDURE — 97140 MANUAL THERAPY 1/> REGIONS: CPT | Performed by: PHYSICAL THERAPIST

## 2019-09-18 PROCEDURE — 97110 THERAPEUTIC EXERCISES: CPT | Performed by: PHYSICAL THERAPIST

## 2019-09-18 NOTE — PROGRESS NOTES
Daily Note     Today's date: 2019  Patient name: Narda Lemos  : 1940  MRN: [id number]  Referring provider: Marium Magallanes  Dx:   Encounter Diagnosis     ICD-10-CM    1  Complete tear of right rotator cuff, unspecified whether traumatic M75 121                   Subjective: Patient is feeling pretty good today  Patient reports only difficulty anymore is holding his arm out the entire time while driving  Patient has been casting and reports it being "good most of the times"  Objective: See treatment diary below      Assessment: Patient tolerated exercise well with difficulty still noted during D2 extension, and using the body blade for stabilization exercises  Patient has achieved one of his functional goals of casting and is progressing to decreased pain during driving  Patient will benefit from continued skilled physical therapy in order to build strength, decrease pain consistently, and achieve all functional goals  Plan: Continue per plan of care  Plan for discharge in next 2 weeks         Daily Treatment Diary   Manuals                  PROM R shoulder ER/IR (gentle) EH RG  RG                                                                                         Exercise Diary                        UBE 3 min ea  L5 3/3  L5  3/3  L5                 Tband Rows with step back Purple  x30 Purple  X 30 Purple x30                 Tband Extension with step back Purple  x30 Purple   x 30 Purple x30                  Tband IR Purple  x30 Purple  X  30  purple x30                 Submax mando ER D/C                     No band robbery webslide Red  2x15 Black  2 x 10  Black 2x10                 Ball circles @ wall - cw/ccw 20x4   ea 20cw/ccw x 4 20 cw/ccw                 Prone rhomboids 3#  2x15 3#  2 x 15  3#  2x15                 Prone middle traps 3#  2x15 3#  2 x 15  3#  2x15                 Manual rhythmic stabilization (FF/Ext, Horiz Abd/Add @ 90 degrees flexion) 30"x3 ea 3 x 10 each way  3x10 each way                 Prone rows NP                     SA punchups 10#  2x20 15#  2 x 20  15# 2x20                 SL shoulder flexion to 90 degrees 3#  2x15 3#  2 x 15  3#  2x15                 Tband ER Blue  3x15 Blue  3 x 15  blue 3x15                 AROM scaption 3#  3x10 3#  3 x 10  3#  3x10                 Body blade  H/V @ 90 degrees flexion (yellow) 10"x3 ea 10sec x 3 each 10sec x3                  Serratus punch @ 135 flexion webslide Black  3" hold, 2x15 Black  3sec  2 x 15  Black 3x10 2x15                  Kesier PNF D2 flexion NV 0 7  2 x 10   8  2x20                  Princess PNF D2 extension NV 9 5  2 x 10  9 5 2x10                  SL ER   3#  3 x 10  3#   3x10                                                                 Modalities                       Cryo R shoulder 10 min post 10 min  10 min

## 2019-09-27 ENCOUNTER — EVALUATION (OUTPATIENT)
Dept: PHYSICAL THERAPY | Facility: CLINIC | Age: 79
End: 2019-09-27
Payer: MEDICARE

## 2019-09-27 ENCOUNTER — OFFICE VISIT (OUTPATIENT)
Dept: UROLOGY | Facility: MEDICAL CENTER | Age: 79
End: 2019-09-27
Payer: MEDICARE

## 2019-09-27 VITALS
DIASTOLIC BLOOD PRESSURE: 82 MMHG | WEIGHT: 192 LBS | SYSTOLIC BLOOD PRESSURE: 156 MMHG | HEART RATE: 79 BPM | BODY MASS INDEX: 29.1 KG/M2 | HEIGHT: 68 IN

## 2019-09-27 DIAGNOSIS — N40.1 BENIGN LOCALIZED HYPERPLASIA OF PROSTATE WITH URINARY OBSTRUCTION AND LOWER URINARY TRACT SYMPTOMS: Primary | ICD-10-CM

## 2019-09-27 DIAGNOSIS — M75.121 COMPLETE TEAR OF RIGHT ROTATOR CUFF, UNSPECIFIED WHETHER TRAUMATIC: Primary | ICD-10-CM

## 2019-09-27 DIAGNOSIS — N13.9 BENIGN LOCALIZED HYPERPLASIA OF PROSTATE WITH URINARY OBSTRUCTION AND LOWER URINARY TRACT SYMPTOMS: Primary | ICD-10-CM

## 2019-09-27 LAB
SL AMB  POCT GLUCOSE, UA: NORMAL
SL AMB LEUKOCYTE ESTERASE,UA: NORMAL
SL AMB POCT BILIRUBIN,UA: NORMAL
SL AMB POCT BLOOD,UA: NORMAL
SL AMB POCT CLARITY,UA: CLEAR
SL AMB POCT COLOR,UA: YELLOW
SL AMB POCT KETONES,UA: NORMAL
SL AMB POCT NITRITE,UA: NORMAL
SL AMB POCT PH,UA: 6
SL AMB POCT SPECIFIC GRAVITY,UA: 1.02
SL AMB POCT URINE PROTEIN: NORMAL
SL AMB POCT UROBILINOGEN: 0.2

## 2019-09-27 PROCEDURE — 97110 THERAPEUTIC EXERCISES: CPT | Performed by: PHYSICAL THERAPIST

## 2019-09-27 PROCEDURE — 97140 MANUAL THERAPY 1/> REGIONS: CPT | Performed by: PHYSICAL THERAPIST

## 2019-09-27 PROCEDURE — 81003 URINALYSIS AUTO W/O SCOPE: CPT | Performed by: UROLOGY

## 2019-09-27 PROCEDURE — 99213 OFFICE O/P EST LOW 20 MIN: CPT | Performed by: UROLOGY

## 2019-09-27 NOTE — PROGRESS NOTES
Daily Note     Today's date: 2019  Patient name: Gadiel Moreno  : 1940  MRN: [id number]  Referring provider: Brittanie Patel  Dx:   Encounter Diagnosis     ICD-10-CM    1  Complete tear of right rotator cuff, unspecified whether traumatic M75 121                   Subjective: "Sometimes it's good, sometimes not so good " Reports 3/10 R shoulder pain in bed which awoke him last night, and also first thing out of bed this morning  Pain level has since reduced to 1/10  He is going to try casting/fishing this weekend  Objective: See treatment diary below  Assessment: Fatigued with therex  Still some functional deficits in pain with driving, sleep interruption associated with pain, decreased overall quality of life associated with pain  Goals  Short Term Goals:  1) Pain : Decrease R shoulder pain to 3/10 at worst x 1 continuous week within 3-4weeks  -met  2) AROM: Improve AROM by at least , 6 degrees ER and 1vertebral levels for IR within 3-4weeks  -met  3) Strength: Improved UE strength by at least 1/2 grade for all noted as weak within 3-4 weeks  -met  4) Function: Improved FOTO score from IE within 3-4 weeks, patient to note greater use of arm for ADLs  -met    LongTerm Goals:   1) Pain : Decrease R shoulder pain to 1/10 at worst x 1 continuous week within 6-8 weeks  -not met  2) AROM: Improve AROM to at least 10 degrees ER and to T7 level for IR within 6-8 weeks  -met  3) Strength: Improved UE strength by at leas 1 grade within 6-8 weeks  -partially met  4) Function: Improved FOTO score to at least 72 ; no reported difficulty with ADLs  -partially met  5) (I) with HEP-not met     Plan: Continue per plan of care  Plan for discharge in next 1- 2 weeks         Daily Treatment Diary   Manuals                PROM R shoulder ER/IR (gentle) EH RG  RG  RG                                                                                       Exercise Diary                      UBE 3 min ea  L5 3/3  L5  3/3  L5  3/3  L5               Tband Rows with step back Purple  x30 Purple  X 30 Purple x30  Purple  X 30               Tband Extension with step back Purple  x30 Purple   x 30 Purple x30   Purple  X 30               Tband IR Purple  x30 Purple  X  30  purple x30  purple   x 30               Submax mando ER D/C                     No band robbery webslide Red  2x15 Black  2 x 10  Black 2x10  Black  3 x 10               Ball circles @ wall - cw/ccw 20x4   ea 20cw/ccw x 4 20 cw/ccw  20/cw/ccw x 4               Prone rhomboids 3#  2x15 3#  2 x 15  3#  2x15  3#  2 x 15               Prone middle traps 3#  2x15 3#  2 x 15  3#  2x15  3#  2 x 15               Manual rhythmic stabilization (FF/Ext, Horiz Abd/Add @ 90 degrees flexion) 30"x3 ea 3 x 10 each way  3x10 each way  3 x 10               Prone rows NP                     SA punchups 10#  2x20 15#  2 x 20  15# 2x20  15#  2 x 20               SL shoulder flexion to 90 degrees 3#  2x15 3#  2 x 15  3#  2x15  3#  2 x 15               Tband ER Blue  3x15 Blue  3 x 15  blue 3x15  Blue  3 x 10               AROM scaption 3#  3x10 3#  3 x 10  3#  3x10  3#  3 x 10               Body blade  H/V @ 90 degrees flexion (yellow) 10"x3 ea 10sec x 3 each 10sec x3   15sec x 3               Serratus punch @ 135 flexion webslide Black  3" hold, 2x15 Black  3sec  2 x 15  Black 3x10 2x15  Black  2 x 20                Kesier PNF D2 flexion NV 0 7  2 x 10   8  2x20    8  2 x 20               [place] PNF D2 extension NV 9 5  2 x 10  9 5 2x10  9 5  2 x 10                SL ER   3#  3 x 10  3#   3x10  3#  3 x 10                                                               Modalities                       Cryo R shoulder 10 min post 10 min  10 min  10 min

## 2019-09-27 NOTE — PROGRESS NOTES
HISTORY:    No change in mild BPH symptoms, on finasteride and tamsulosin for years now  Nocturia times 1-2, daytime not much frequency, stream is good and feels like empties okay  No hematuria infections stones  Last PSA was 3 7, and he has declined to have anymore PSAs due to his age  ASSESSMENT / PLAN:    Very stable voiding pattern, prostate feels the same as it always had  At this point we will see him back in two years, sooner if any issues  The following portions of the patient's history were reviewed and updated as appropriate: allergies, current medications, past family history, past medical history, past social history, past surgical history and problem list     Review of Systems   All other systems reviewed and are negative  Objective:     Physical Exam   Constitutional: He is oriented to person, place, and time  He appears well-developed and well-nourished  No distress  HENT:   Head: Normocephalic and atraumatic  Eyes: Conjunctivae are normal    Cardiovascular: Normal rate and regular rhythm  Pulmonary/Chest: Effort normal and breath sounds normal  No respiratory distress  He has no wheezes  Abdominal: Soft  Bowel sounds are normal  He exhibits no distension and no mass  There is no tenderness  Genitourinary:   Genitourinary Comments: Penis testes normal    Prostate moderately enlarged, right lobe bigger than left, no change over the years   Neurological: He is alert and oriented to person, place, and time  Skin: Skin is warm and dry  He is not diaphoretic           No results found for: PSA]  No results found for: BUN  No results found for: CREATININE  No components found for: CBC      Patient Active Problem List   Diagnosis    BPH with obstruction/lower urinary tract symptoms    Benign localized hyperplasia of prostate with urinary obstruction and lower urinary tract symptoms        Diagnoses and all orders for this visit:    Benign localized hyperplasia of prostate with urinary obstruction and lower urinary tract symptoms  -     POCT urine dip auto non-scope           Patient ID: Valerie Perez is a 78 y o  male        Current Outpatient Medications:     aspirin (ASPIRIN LOW DOSE) 81 MG tablet, Take by mouth, Disp: , Rfl:     cromolyn (OPTICROM) 4 % ophthalmic solution, INSTILL 1 DROP BY OPHTHALMIC ROUTE 4 TIMES EVERY DAY INTO AFFECTED EYE(S) OR AS DIRECTED , Disp: , Rfl: 0    fluticasone (FLONASE) 50 mcg/act nasal spray, into each nostril, Disp: , Rfl:     ibuprofen (MOTRIN) 600 mg tablet, Take 600 mg by mouth every 6 (six) hours as needed, Disp: , Rfl: 0    lisinopril (ZESTRIL) 2 5 mg tablet, Take by mouth, Disp: , Rfl:     simvastatin (ZOCOR) 20 mg tablet, Take by mouth, Disp: , Rfl:     tamsulosin (FLOMAX) 0 4 mg, One daily, after supper, Disp: 90 capsule, Rfl: 3    cholecalciferol (VITAMIN D3) 1,000 units tablet, Take 1,000 Units by mouth daily, Disp: , Rfl:     fexofenadine (ALLEGRA) 180 MG tablet, Take by mouth, Disp: , Rfl:     finasteride (PROSCAR) 5 mg tablet, TAKE 1 TABLET EVERY DAY AS DIRECTED (Patient not taking: Reported on 9/27/2019), Disp: 90 tablet, Rfl: 3    HYDROcodone-acetaminophen (NORCO) 5-325 mg per tablet, Take 1 tablet by mouth Every 4 to 6 hours as needed for pain, Disp: , Rfl: 0    omeprazole (PriLOSEC) 40 MG capsule, Take by mouth, Disp: , Rfl:     Past Medical History:   Diagnosis Date    Back pain     BPH with obstruction/lower urinary tract symptoms 2017    BPH with urinary obstruction 2012    Cataract     Cervicalgia     Chronic sinusitis     Elevated PSA 2012    Frequency of urination     Headache     Hypertension     Joint pain     Kidney stone     Seasonal allergies        Past Surgical History:   Procedure Laterality Date    BACK SURGERY  1985    CARPAL TUNNEL RELEASE  2010    CYSTOSCOPY W/ URETEROSCOPY Left 2007    PROSTATE BIOPSY Bilateral 2012    ROTATOR CUFF REPAIR Bilateral 2008, 2010    Left- 2008; Right- 2010     [facility]  2008, 2012       Social History

## 2019-09-30 ENCOUNTER — APPOINTMENT (OUTPATIENT)
Dept: PHYSICAL THERAPY | Facility: CLINIC | Age: 79
End: 2019-09-30
Payer: MEDICARE

## 2019-10-02 ENCOUNTER — APPOINTMENT (OUTPATIENT)
Dept: PHYSICAL THERAPY | Facility: CLINIC | Age: 79
End: 2019-10-02
Payer: MEDICARE

## 2019-10-03 ENCOUNTER — OFFICE VISIT (OUTPATIENT)
Dept: PHYSICAL THERAPY | Facility: CLINIC | Age: 79
End: 2019-10-03
Payer: MEDICARE

## 2019-10-03 DIAGNOSIS — M75.121 COMPLETE TEAR OF RIGHT ROTATOR CUFF, UNSPECIFIED WHETHER TRAUMATIC: Primary | ICD-10-CM

## 2019-10-03 PROCEDURE — 97110 THERAPEUTIC EXERCISES: CPT

## 2019-10-03 PROCEDURE — 97140 MANUAL THERAPY 1/> REGIONS: CPT

## 2019-10-03 NOTE — PROGRESS NOTES
Daily Note     Today's date: 10/3/2019  Patient name: Dianna Schreiber  : 1940  MRN: [id number]  Referring provider: Marita Holliday  Dx:   Encounter Diagnosis     ICD-10-CM    1  Complete tear of right rotator cuff, unspecified whether traumatic M75 121          Subjective: Patient noted no new complaints  Objective: See treatment diary below      Assessment: Tolerated treatment well  Patient was bale to perform s/l ER and s/l flex to 90 with 4# instead of 3 # today  Patient exhibited good technique with therapeutic exercises and would benefit from continued PT      Plan: Continue per plan of care         Daily Treatment Diary   Manuals 9/12 9/16  9/18  9/27  10/3             PROM R shoulder ER/IR (gentle) EH RG  RG  RG  af                                                                                     Exercise Diary                        UBE 3 min ea  L5 3/3  L5  3/3  L5  3/3  L5  3/3 L5             Tband Rows with step back Purple  x30 Purple  X 30 Purple x30  Purple  X 30  Purple x30             Tband Extension with step back Purple  x30 Purple   x 30 Purple x30   Purple  X 30  purple x30             Tband IR Purple  x30 Purple  X  30  purple x30  purple   x 30  purple x30             Submax mando ER D/C                     No band robbery webslide Red  2x15 Black  2 x 10  Black 2x10  Black  3 x 10  Black  3 x 10             Ball circles @ wall - cw/ccw 20x4   ea 20cw/ccw x 4 20 cw/ccw  20/cw/ccw x 4  20 CW CCw             Prone rhomboids 3#  2x15 3#  2 x 15  3#  2x15  3#  2 x 15  3# 2x15             Prone middle traps 3#  2x15 3#  2 x 15  3#  2x15  3#  2 x 15  3#  2x15             Manual rhythmic stabilization (FF/Ext, Horiz Abd/Add @ 90 degrees flexion) 30"x3 ea 3 x 10 each way  3x10 each way  3 x 10               Prone rows NP                     SA punchups 10#  2x20 15#  2 x 20  15# 2x20  15#  2 x 20  15#  2 x 20             SL shoulder flexion to 90 degrees 3#  2x15 3#  2 x 15  3#  2x15  3#  2 x 15  4# 2x10             Tband ER Blue  3x15 Blue  3 x 15  blue 3x15  Blue  3 x 10  blue 3x10             AROM scaption 3#  3x10 3#  3 x 10  3#  3x10  3#  3 x 10  3#x3x10             Body blade  H/V @ 90 degrees flexion (yellow) 10"x3 ea 10sec x 3 each 10sec x3   15sec x 3  15sec x 3             Serratus punch @ 135 flexion webslide Black  3" hold, 2x15 Black  3sec  2 x 15  Black 3x10 2x15  Black  2 x 20  black 2x20              Kesier PNF D2 flexion NV 0 7  2 x 10   8  2x20    8  2 x 20               [place] PNF D2 extension NV 9 5  2 x 10  9 5 2x10  9 5  2 x 10                SL ER   3#  3 x 10  3#   3x10  3#  3 x 10  4# 3x10                                                             Modalities                       Cryo R shoulder 10 min post 10 min  10 min  10 min

## 2019-10-04 ENCOUNTER — OFFICE VISIT (OUTPATIENT)
Dept: PHYSICAL THERAPY | Facility: CLINIC | Age: 79
End: 2019-10-04
Payer: MEDICARE

## 2019-10-04 DIAGNOSIS — M75.121 COMPLETE TEAR OF RIGHT ROTATOR CUFF, UNSPECIFIED WHETHER TRAUMATIC: Primary | ICD-10-CM

## 2019-10-04 PROCEDURE — 97140 MANUAL THERAPY 1/> REGIONS: CPT | Performed by: PHYSICAL THERAPIST

## 2019-10-04 PROCEDURE — 97110 THERAPEUTIC EXERCISES: CPT | Performed by: PHYSICAL THERAPIST

## 2019-10-04 NOTE — PROGRESS NOTES
Discharge  Today's date: 10/4/2019  Patient name: Philly Solano  : 1940  MRN: [id number]  Referring provider: Yina Awad  Dx:   Encounter Diagnosis     ICD-10-CM    1  Complete tear of right rotator cuff, unspecified whether traumatic M75 121        Assessment/Plan:    Patient is a 78 y o  male who  presents with  a diagnosis of a R rotator cuff tear  PMH significant for a previous repair about 10 years ago  yV [place] to avoid TSA   Over the past month pt has made the following progress towards goals:  1) Decreased pain  2) Improved range of motion  3) Improved strength  4) Improved self rated functional score  (FOTO score improved to 72 from 59 at time of initial visit)    Functioning well  Advised patient to continue with home exercise program which I reviewed with them today  Advised patient to contact me with any future questions or concerns regarding exercise  Pt is in agreement with discharge plan  He is considering joining out fitness program           Short Term Goals:  1) Pain : Decrease R shoulder pain to 3/10 at worst x 1 continuous week within 3-4weeks  -met  2) AROM: Improve AROM by at least , 6 degrees ER and 1vertebral levels for IR within 3-4weeks  -met  3) Strength: Improved UE strength by at least 1/2 grade for all noted as weak within 3-4 weeks  -met  4) Function: Improved FOTO score from IE within 3-4 weeks, patient to note greater use of arm for ADLs  -met    LongTerm Goals:   1) Pain : Decrease R shoulder pain to 1/10 at worst x 1 continuous week within 6-8 weeks  -not met  2) AROM: Improve AROM to at least 10 degrees ER and to T7 level for IR within 6-8 weeks  -met  3) Strength: Improved UE strength by at leas 1 grade within 6-8 weeks  -partially met  4) Function: Improved FOTO score to at least 72 ; no reported difficulty with ADLs  - met  5) (I) with HEP- met          Subjective: "I'm pleased with progress " Able to complete all ADLs without pain > 3/10  Able to cast/fish  Objective: See treatment diary below        Active Range of Motion     Right Shoulder   Flexion: 165 degrees with pain  Abduction: 165 degrees with pain  External rotation 90°: 80 degrees  Internal rotation BTB: T7 with pain     Passive Range of Motion         Strength/Myotome Testing     Right Shoulder      Planes of Motion   Flexion: 5  Extension: 5   Abduction: 4+  Adduction: 5   External rotation at 0°: 5  Internal rotation at 0°: 5     Isolated Muscles   Biceps: 5   Lower trapezius: 4  Rhomboids: 4  Serratus anterior: 5   Triceps: 5      Tests      Right Shoulder   Positive empty can and Hawkin's  Negative drop arm, external rotation lag sign and lift-off               Precautions: none  Daily Treatment Diary   Manuals 9/12 9/16  9/18  9/27  10/3  10/4           PROM R shoulder ER/IR (gentle) EH RG  RG  RG  af  RG                                                                                   Exercise Diary                        UBE 3 min ea  L5 3/3  L5  3/3  L5  3/3  L5  3/3 L5  3/34L6           Tband Rows with step back Purple  x30 Purple  X 30 Purple x30  Purple  X 30  Purple x30  Purple  X  30           Tband Extension with step back Purple  x30 Purple   x 30 Purple x30   Purple  X 30  purple x30  Purple    X 30           Tband IR Purple  x30 Purple  X  30  purple x30  purple   x 30  purple x30  Purple x 30           Submax mando ER D/C                     No band robbery webslide Red  2x15 Black  2 x 10  Black 2x10  Black  3 x 10  Black  3 x 10  Black  3 x 10           Ball circles @ wall - cw/ccw 20x4   ea 20cw/ccw x 4 20 cw/ccw  20/cw/ccw x 4  20 CW CCw  20cw/cc x 4           Prone rhomboids 3#  2x15 3#  2 x 15  3#  2x15  3#  2 x 15  3# 2x15 3#  2 x 15           Prone middle traps 3#  2x15 3#  2 x 15  3#  2x15  3#  2 x 15  3#  2x15  3#  2 x 15           Manual rhythmic stabilization (FF/Ext, Horiz Abd/Add @ 90 degrees flexion) 30"x3 ea 3 x 10 each way  3x10 each way  3 x 10               Prone rows NP                     SA punchups 10#  2x20 15#  2 x 20  15# 2x20  15#  2 x 20  15#  2 x 20  20#  2 x 20           SL shoulder flexion to 90 degrees 3#  2x15 3#  2 x 15  3#  2x15  3#  2 x 15  4# 2x10  4#  2 x 10           Tband ER Blue  3x15 Blue  3 x 15  blue 3x15  Blue  3 x 10  blue 3x10  Blue  3 x 10           AROM scaption 3#  3x10 3#  3 x 10  3#  3x10  3#  3 x 10  3#x3x10  3#  3 x 10           Body blade  H/V @ 90 degrees flexion (yellow) 10"x3 ea 10sec x 3 each 10sec x3   15sec x 3  15sec x 3  15sec x 4           Serratus punch @ 135 flexion webslide Black  3" hold, 2x15 Black  3sec  2 x 15  Black 3x10 2x15  Black  2 x 20  black 2x20  Black2  2 x 20            Kesier PNF D2 flexion NV 0 7  2 x 10   8  2x20    8  2 x 20               [place] PNF D2 extension NV 9 5  2 x 10  9 5 2x10  9 5  2 x 10                SL ER   3#  3 x 10  3#   3x10  3#  3 x 10  4# 3x10  4#  3 x10                                                           Modalities                       Cryo R shoulder 10 min post 10 min  10 min  10 min   10 min

## 2020-08-06 PROBLEM — R09.82 POST-NASAL DRIP: Status: ACTIVE | Noted: 2020-08-06

## 2020-08-06 PROBLEM — J34.1 MAXILLARY SINUS CYST: Status: ACTIVE | Noted: 2020-08-06

## 2020-08-06 PROBLEM — J31.0 CHRONIC RHINITIS: Status: ACTIVE | Noted: 2020-08-06

## 2020-09-24 ENCOUNTER — TELEPHONE (OUTPATIENT)
Dept: UROLOGY | Facility: MEDICAL CENTER | Age: 80
End: 2020-09-24

## 2020-09-24 ENCOUNTER — OFFICE VISIT (OUTPATIENT)
Dept: UROLOGY | Facility: MEDICAL CENTER | Age: 80
End: 2020-09-24
Payer: MEDICARE

## 2020-09-24 VITALS
WEIGHT: 191 LBS | HEIGHT: 68 IN | BODY MASS INDEX: 28.95 KG/M2 | SYSTOLIC BLOOD PRESSURE: 144 MMHG | TEMPERATURE: 97.2 F | DIASTOLIC BLOOD PRESSURE: 82 MMHG

## 2020-09-24 DIAGNOSIS — N40.1 BPH WITH URINARY OBSTRUCTION: Primary | ICD-10-CM

## 2020-09-24 DIAGNOSIS — N13.8 BPH WITH URINARY OBSTRUCTION: Primary | ICD-10-CM

## 2020-09-24 PROCEDURE — 99214 OFFICE O/P EST MOD 30 MIN: CPT | Performed by: UROLOGY

## 2020-09-24 RX ORDER — ESCITALOPRAM OXALATE 20 MG/1
TABLET ORAL
COMMUNITY

## 2020-09-24 RX ORDER — CALCIUM CARBONATE/VITAMIN D2 500 MG-125
TABLET ORAL
COMMUNITY
End: 2020-10-05

## 2020-09-24 RX ORDER — ASPIRIN 81 MG/1
81 TABLET ORAL DAILY
COMMUNITY
End: 2020-10-21 | Stop reason: HOSPADM

## 2020-09-24 RX ORDER — IPRATROPIUM BROMIDE 42 UG/1
2 SPRAY, METERED NASAL 2 TIMES DAILY PRN
COMMUNITY
Start: 2020-08-07

## 2020-09-24 NOTE — PROGRESS NOTES
HISTORY:    Long-term BPH, with worsening symptoms over the past year  He has been on tamsulosin for years now, but he says there times his urine is so slow it just trickles out  Difficulty emptying, urgency frequency  Sense of poor emptying, nocturia times 1-2  No hematuria infections stones  ASSESSMENT / PLAN:    Options discussed  Patient is quite large prostate on rectal exam, over 80 mL in volume at last CT 2016  I recommend TURP  Potential complications discussed, he consents  The following portions of the patient's history were reviewed and updated as appropriate: allergies, current medications, past family history, past medical history, past social history, past surgical history and problem list     Review of Systems   All other systems reviewed and are negative  Objective:     Physical Exam  Constitutional:       General: He is not in acute distress  Appearance: He is well-developed  He is not diaphoretic  HENT:      Head: Normocephalic and atraumatic  Eyes:      General: No scleral icterus  Pulmonary:      Effort: Pulmonary effort is normal    Genitourinary:     Comments:   Penis testes normal     Prostate markedly enlarged no nodules  Skin:     Coloration: Skin is not pale  Neurological:      Mental Status: He is alert and oriented to person, place, and time  Psychiatric:         Behavior: Behavior normal          Thought Content:  Thought content normal          Judgment: Judgment normal            No results found for: PSA]  No results found for: BUN  No results found for: CREATININE  No components found for: CBC      Patient Active Problem List   Diagnosis    BPH with obstruction/lower urinary tract symptoms    Benign localized hyperplasia of prostate with urinary obstruction and lower urinary tract symptoms    Post-nasal drip    Maxillary sinus cyst    Chronic rhinitis        Diagnoses and all orders for this visit:    BPH with urinary obstruction    Other orders  -     Ascorbic Acid 500 MG CAPS; TAKE ONE TABLET BY MOUTH EVERY DAY  -     Glucosamine-Chondroit-Vit C-Mn ([facility] Glucosamine-Chondroitin) TABS; 2 TABS MOUTH EVERY DAY  -     escitalopram (LEXAPRO) 20 mg tablet; TAKE ONE-HALF TABLET BY MOUTH DAILY FOR MOOD  -     FEXOFENADINE HCL PO; TAKE ONE TABLET BY MOUTH EVERY DAY  -     Pediatric Multivitamins-Fl (MULTIVITAMINS/FL PO); TAKE ONE TABLET BY MOUTH EVERY DAY  -     ipratropium (ATROVENT) 0 06 % nasal spray; INSTILL 2 SPRAYS IN NOSTRIL(S) FOUR TIMES A DAY FOR NASAL ALLERGIES  -     aspirin (ECOTRIN LOW STRENGTH) 81 mg EC tablet; Take 81 mg by mouth daily  -     Ascorbic Acid (VITAMIN C PO); Take by mouth           Patient ID: Antonella Washburn is a [de-identified] y o  male        Current Outpatient Medications:     Ascorbic Acid (VITAMIN C PO), Take by mouth, Disp: , Rfl:     aspirin (ECOTRIN LOW STRENGTH) 81 mg EC tablet, Take 81 mg by mouth daily, Disp: , Rfl:     fluticasone (FLONASE) 50 mcg/act nasal spray, into each nostril, Disp: , Rfl:     ipratropium (ATROVENT) 0 06 % nasal spray, INSTILL 2 SPRAYS IN NOSTRIL(S) FOUR TIMES A DAY FOR NASAL ALLERGIES, Disp: , Rfl:     lisinopril (ZESTRIL) 2 5 mg tablet, Take 10 mg by mouth , Disp: , Rfl:     simvastatin (ZOCOR) 20 mg tablet, Take by mouth, Disp: , Rfl:     tamsulosin (FLOMAX) 0 4 mg, One daily, after supper, Disp: 90 capsule, Rfl: 3    Ascorbic Acid 500 MG CAPS, TAKE ONE TABLET BY MOUTH EVERY DAY, Disp: , Rfl:     cromolyn (OPTICROM) 4 % ophthalmic solution, INSTILL 1 DROP BY OPHTHALMIC ROUTE 4 TIMES EVERY DAY INTO AFFECTED EYE(S) OR AS DIRECTED , Disp: , Rfl: 0    escitalopram (LEXAPRO) 20 mg tablet, TAKE ONE-HALF TABLET BY MOUTH DAILY FOR MOOD, Disp: , Rfl:     fexofenadine (ALLEGRA) 180 MG tablet, Take by mouth, Disp: , Rfl:     FEXOFENADINE HCL PO, TAKE ONE TABLET BY MOUTH EVERY DAY, Disp: , Rfl:     Glucosamine-Chondroit-Vit C-Mn ([facility] Glucosamine-Chondroitin) TABS, 2 TABS MOUTH EVERY DAY, Disp: , Rfl:     ibuprofen (MOTRIN) 600 mg tablet, Take 600 mg by mouth every 6 (six) hours as needed, Disp: , Rfl: 0    Pediatric Multivitamins-Fl (MULTIVITAMINS/FL PO), TAKE ONE TABLET BY MOUTH EVERY DAY, Disp: , Rfl:     Past Medical History:   Diagnosis Date    Back pain     BPH with obstruction/lower urinary tract symptoms 2017    BPH with urinary obstruction 2012    Cataract     Cervicalgia     Chronic sinusitis     Elevated PSA 2012    Frequency of urination     Headache     Hypertension     Joint pain     Kidney stone     Seasonal allergies        Past Surgical History:   Procedure Laterality Date    BACK SURGERY  1985    CARPAL TUNNEL RELEASE  2010    CYSTOSCOPY W/ URETEROSCOPY Left 2007    PROSTATE BIOPSY Bilateral 2012    ROTATOR CUFF REPAIR Bilateral 2008, 2010    Left- 2008; Right- 2010     [address] [place] [address]  2008, 2012       Social History

## 2020-09-24 NOTE — TELEPHONE ENCOUNTER
I spoke to the patient and scheduled his TURP with Dr Carmen Hollis at [facility] for 10/20/2020    -instructions given verbally and Emailed  CBC, CMP, COVD, T&S, and Urine C&S in PAT OV  -patient will stop his Aspirin 7 days prior  -[employer] - no auth required

## 2020-09-28 ENCOUNTER — APPOINTMENT (OUTPATIENT)
Dept: LAB | Facility: MEDICAL CENTER | Age: 80
End: 2020-09-28
Attending: UROLOGY
Payer: MEDICARE

## 2020-09-28 DIAGNOSIS — N40.1 ENLARGED PROSTATE WITH URINARY OBSTRUCTION: ICD-10-CM

## 2020-09-28 DIAGNOSIS — R39.89 SUSPECTED UTI: ICD-10-CM

## 2020-09-28 DIAGNOSIS — N13.8 ENLARGED PROSTATE WITH URINARY OBSTRUCTION: ICD-10-CM

## 2020-09-28 DIAGNOSIS — Z01.812 PRE-OPERATIVE LABORATORY EXAMINATION: ICD-10-CM

## 2020-09-28 LAB
ABO GROUP BLD: NORMAL
ALBUMIN SERPL BCP-MCNC: 3.9 G/DL (ref 3.5–5)
ALP SERPL-CCNC: 68 U/L (ref 46–116)
ALT SERPL W P-5'-P-CCNC: 34 U/L (ref 12–78)
ANION GAP SERPL CALCULATED.3IONS-SCNC: 5 MMOL/L (ref 4–13)
AST SERPL W P-5'-P-CCNC: 29 U/L (ref 5–45)
BASOPHILS # BLD AUTO: 0.03 THOUSANDS/ΜL (ref 0–0.1)
BASOPHILS NFR BLD AUTO: 1 % (ref 0–1)
BILIRUB SERPL-MCNC: 0.65 MG/DL (ref 0.2–1)
BLD GP AB SCN SERPL QL: NEGATIVE
BUN SERPL-MCNC: 25 MG/DL (ref 5–25)
CALCIUM SERPL-MCNC: 9 MG/DL (ref 8.3–10.1)
CHLORIDE SERPL-SCNC: 107 MMOL/L (ref 100–108)
CO2 SERPL-SCNC: 27 MMOL/L (ref 21–32)
CREAT SERPL-MCNC: 0.88 MG/DL (ref 0.6–1.3)
EOSINOPHIL # BLD AUTO: 0.14 THOUSAND/ΜL (ref 0–0.61)
EOSINOPHIL NFR BLD AUTO: 2 % (ref 0–6)
ERYTHROCYTE [DISTWIDTH] IN BLOOD BY AUTOMATED COUNT: 12.7 % (ref 11.6–15.1)
GFR SERPL CREATININE-BSD FRML MDRD: 81 ML/MIN/1.73SQ M
GLUCOSE P FAST SERPL-MCNC: 96 MG/DL (ref 65–99)
HCT VFR BLD AUTO: 46.4 % (ref 36.5–49.3)
HGB BLD-MCNC: 15 G/DL (ref 12–17)
IMM GRANULOCYTES # BLD AUTO: 0.02 THOUSAND/UL (ref 0–0.2)
IMM GRANULOCYTES NFR BLD AUTO: 0 % (ref 0–2)
LYMPHOCYTES # BLD AUTO: 1.7 THOUSANDS/ΜL (ref 0.6–4.47)
LYMPHOCYTES NFR BLD AUTO: 30 % (ref 14–44)
MCH RBC QN AUTO: 29.9 PG (ref 26.8–34.3)
MCHC RBC AUTO-ENTMCNC: 32.3 G/DL (ref 31.4–37.4)
MCV RBC AUTO: 93 FL (ref 82–98)
MONOCYTES # BLD AUTO: 0.53 THOUSAND/ΜL (ref 0.17–1.22)
MONOCYTES NFR BLD AUTO: 9 % (ref 4–12)
NEUTROPHILS # BLD AUTO: 3.34 THOUSANDS/ΜL (ref 1.85–7.62)
NEUTS SEG NFR BLD AUTO: 58 % (ref 43–75)
NRBC BLD AUTO-RTO: 0 /100 WBCS
PLATELET # BLD AUTO: 193 THOUSANDS/UL (ref 149–390)
PMV BLD AUTO: 11.4 FL (ref 8.9–12.7)
POTASSIUM SERPL-SCNC: 4.4 MMOL/L (ref 3.5–5.3)
PROT SERPL-MCNC: 6.9 G/DL (ref 6.4–8.2)
RBC # BLD AUTO: 5.01 MILLION/UL (ref 3.88–5.62)
RH BLD: POSITIVE
SODIUM SERPL-SCNC: 139 MMOL/L (ref 136–145)
SPECIMEN EXPIRATION DATE: NORMAL
WBC # BLD AUTO: 5.76 THOUSAND/UL (ref 4.31–10.16)

## 2020-09-28 PROCEDURE — 80053 COMPREHEN METABOLIC PANEL: CPT

## 2020-09-28 PROCEDURE — 36415 COLL VENOUS BLD VENIPUNCTURE: CPT

## 2020-09-28 PROCEDURE — 86901 BLOOD TYPING SEROLOGIC RH(D): CPT

## 2020-09-28 PROCEDURE — 86850 RBC ANTIBODY SCREEN: CPT

## 2020-09-28 PROCEDURE — 86900 BLOOD TYPING SEROLOGIC ABO: CPT

## 2020-09-28 PROCEDURE — 87086 URINE CULTURE/COLONY COUNT: CPT

## 2020-09-28 PROCEDURE — 85025 COMPLETE CBC W/AUTO DIFF WBC: CPT

## 2020-09-29 LAB — BACTERIA UR CULT: NORMAL

## 2020-10-05 RX ORDER — LISINOPRIL 10 MG/1
10 TABLET ORAL EVERY EVENING
COMMUNITY

## 2020-10-16 DIAGNOSIS — N13.8 ENLARGED PROSTATE WITH URINARY OBSTRUCTION: ICD-10-CM

## 2020-10-16 DIAGNOSIS — N40.1 ENLARGED PROSTATE WITH URINARY OBSTRUCTION: ICD-10-CM

## 2020-10-16 DIAGNOSIS — Z01.812 PRE-OPERATIVE LABORATORY EXAMINATION: ICD-10-CM

## 2020-10-16 DIAGNOSIS — Z11.59 SCREENING FOR VIRAL DISEASE: ICD-10-CM

## 2020-10-16 PROCEDURE — U0003 INFECTIOUS AGENT DETECTION BY NUCLEIC ACID (DNA OR RNA); SEVERE ACUTE RESPIRATORY SYNDROME CORONAVIRUS 2 (SARS-COV-2) (CORONAVIRUS DISEASE [COVID-19]), AMPLIFIED PROBE TECHNIQUE, MAKING USE OF HIGH THROUGHPUT TECHNOLOGIES AS DESCRIBED BY CMS-2020-01-R: HCPCS | Performed by: UROLOGY

## 2020-10-17 LAB — SARS-COV-2 RNA SPEC QL NAA+PROBE: NOT DETECTED

## 2020-10-19 ENCOUNTER — ANESTHESIA EVENT (OUTPATIENT)
Dept: PERIOP | Facility: HOSPITAL | Age: 80
End: 2020-10-19
Payer: MEDICARE

## 2020-10-19 PROCEDURE — NC001 PR NO CHARGE: Performed by: UROLOGY

## 2020-10-19 NOTE — TELEPHONE ENCOUNTER
Patient called in asking why he needs to take fleet at [address] if the surgery is scheduled for the afternoon     Patient can be reached at [phone]

## 2020-10-19 NOTE — TELEPHONE ENCOUNTER
I spoke to the patient, and advised he does need to use the fleets enema the night prior as directed  Patient will be NPO and the arrival time will not change those directions

## 2020-10-20 ENCOUNTER — ANESTHESIA (OUTPATIENT)
Dept: PERIOP | Facility: HOSPITAL | Age: 80
End: 2020-10-20
Payer: MEDICARE

## 2020-10-20 ENCOUNTER — HOSPITAL ENCOUNTER (OUTPATIENT)
Facility: HOSPITAL | Age: 80
Setting detail: OUTPATIENT SURGERY
Discharge: HOME | End: 2020-10-21
Attending: UROLOGY | Admitting: UROLOGY
Payer: MEDICARE

## 2020-10-20 VITALS — HEART RATE: 105 BPM

## 2020-10-20 DIAGNOSIS — N13.8 BPH WITH OBSTRUCTION/LOWER URINARY TRACT SYMPTOMS: Primary | ICD-10-CM

## 2020-10-20 DIAGNOSIS — N13.8 ENLARGED PROSTATE WITH URINARY OBSTRUCTION: ICD-10-CM

## 2020-10-20 DIAGNOSIS — N40.1 ENLARGED PROSTATE WITH URINARY OBSTRUCTION: ICD-10-CM

## 2020-10-20 DIAGNOSIS — N40.1 BPH WITH OBSTRUCTION/LOWER URINARY TRACT SYMPTOMS: Primary | ICD-10-CM

## 2020-10-20 LAB
ABO GROUP BLD: NORMAL
RH BLD: POSITIVE

## 2020-10-20 PROCEDURE — 88344 IMHCHEM/IMCYTCHM EA MLT ANTB: CPT | Performed by: PATHOLOGY

## 2020-10-20 PROCEDURE — 52601 PROSTATECTOMY (TURP): CPT | Performed by: UROLOGY

## 2020-10-20 PROCEDURE — 88305 TISSUE EXAM BY PATHOLOGIST: CPT | Performed by: PATHOLOGY

## 2020-10-20 RX ORDER — ONDANSETRON 2 MG/ML
4 INJECTION INTRAMUSCULAR; INTRAVENOUS EVERY 6 HOURS PRN
Status: DISCONTINUED | OUTPATIENT
Start: 2020-10-20 | End: 2020-10-21 | Stop reason: HOSPADM

## 2020-10-20 RX ORDER — FENTANYL CITRATE/PF 50 MCG/ML
25 SYRINGE (ML) INJECTION
Status: COMPLETED | OUTPATIENT
Start: 2020-10-20 | End: 2020-10-20

## 2020-10-20 RX ORDER — SODIUM CHLORIDE, SODIUM LACTATE, POTASSIUM CHLORIDE, CALCIUM CHLORIDE 600; 310; 30; 20 MG/100ML; MG/100ML; MG/100ML; MG/100ML
125 INJECTION, SOLUTION INTRAVENOUS CONTINUOUS
Status: DISCONTINUED | OUTPATIENT
Start: 2020-10-20 | End: 2020-10-21 | Stop reason: HOSPADM

## 2020-10-20 RX ORDER — TRAMADOL HYDROCHLORIDE 50 MG/1
50 TABLET ORAL EVERY 6 HOURS PRN
Status: DISCONTINUED | OUTPATIENT
Start: 2020-10-20 | End: 2020-10-21 | Stop reason: HOSPADM

## 2020-10-20 RX ORDER — LISINOPRIL 10 MG/1
10 TABLET ORAL EVERY EVENING
Status: DISCONTINUED | OUTPATIENT
Start: 2020-10-20 | End: 2020-10-21 | Stop reason: HOSPADM

## 2020-10-20 RX ORDER — SODIUM CHLORIDE 9 MG/ML
125 INJECTION, SOLUTION INTRAVENOUS CONTINUOUS
Status: DISCONTINUED | OUTPATIENT
Start: 2020-10-20 | End: 2020-10-20

## 2020-10-20 RX ORDER — ACETAMINOPHEN 325 MG/1
650 TABLET ORAL EVERY 6 HOURS SCHEDULED
Status: DISCONTINUED | OUTPATIENT
Start: 2020-10-20 | End: 2020-10-21 | Stop reason: HOSPADM

## 2020-10-20 RX ORDER — FLUTICASONE PROPIONATE 50 MCG
2 SPRAY, SUSPENSION (ML) NASAL DAILY
Status: DISCONTINUED | OUTPATIENT
Start: 2020-10-20 | End: 2020-10-21 | Stop reason: HOSPADM

## 2020-10-20 RX ORDER — LIDOCAINE HYDROCHLORIDE 20 MG/ML
INJECTION, SOLUTION INFILTRATION; PERINEURAL AS NEEDED
Status: DISCONTINUED | OUTPATIENT
Start: 2020-10-20 | End: 2020-10-20

## 2020-10-20 RX ORDER — ONDANSETRON 2 MG/ML
4 INJECTION INTRAMUSCULAR; INTRAVENOUS ONCE AS NEEDED
Status: DISCONTINUED | OUTPATIENT
Start: 2020-10-20 | End: 2020-10-20 | Stop reason: HOSPADM

## 2020-10-20 RX ORDER — CEFAZOLIN SODIUM 1 G/50ML
1000 SOLUTION INTRAVENOUS EVERY 8 HOURS
Status: COMPLETED | OUTPATIENT
Start: 2020-10-20 | End: 2020-10-21

## 2020-10-20 RX ORDER — PROPOFOL 10 MG/ML
INJECTION, EMULSION INTRAVENOUS AS NEEDED
Status: DISCONTINUED | OUTPATIENT
Start: 2020-10-20 | End: 2020-10-20

## 2020-10-20 RX ORDER — ESCITALOPRAM OXALATE 10 MG/1
10 TABLET ORAL DAILY
Status: DISCONTINUED | OUTPATIENT
Start: 2020-10-20 | End: 2020-10-21 | Stop reason: HOSPADM

## 2020-10-20 RX ORDER — HEPARIN SODIUM 5000 [USP'U]/ML
5000 INJECTION, SOLUTION INTRAVENOUS; SUBCUTANEOUS EVERY 8 HOURS SCHEDULED
Status: DISCONTINUED | OUTPATIENT
Start: 2020-10-20 | End: 2020-10-21 | Stop reason: HOSPADM

## 2020-10-20 RX ORDER — SORBITOL 30 G/1000ML
IRRIGANT IRRIGATION AS NEEDED
Status: DISCONTINUED | OUTPATIENT
Start: 2020-10-20 | End: 2020-10-20 | Stop reason: HOSPADM

## 2020-10-20 RX ORDER — MAGNESIUM HYDROXIDE 1200 MG/15ML
LIQUID ORAL AS NEEDED
Status: DISCONTINUED | OUTPATIENT
Start: 2020-10-20 | End: 2020-10-20 | Stop reason: HOSPADM

## 2020-10-20 RX ORDER — FENTANYL CITRATE 50 UG/ML
INJECTION, SOLUTION INTRAMUSCULAR; INTRAVENOUS AS NEEDED
Status: DISCONTINUED | OUTPATIENT
Start: 2020-10-20 | End: 2020-10-20

## 2020-10-20 RX ORDER — IPRATROPIUM BROMIDE 42 UG/1
2 SPRAY, METERED NASAL 2 TIMES DAILY
Status: DISCONTINUED | OUTPATIENT
Start: 2020-10-20 | End: 2020-10-21 | Stop reason: CLARIF

## 2020-10-20 RX ORDER — CEFAZOLIN SODIUM 2 G/50ML
2000 SOLUTION INTRAVENOUS ONCE
Status: COMPLETED | OUTPATIENT
Start: 2020-10-20 | End: 2020-10-20

## 2020-10-20 RX ORDER — OXYBUTYNIN CHLORIDE 5 MG/1
5 TABLET ORAL EVERY 6 HOURS PRN
Status: DISCONTINUED | OUTPATIENT
Start: 2020-10-20 | End: 2020-10-21 | Stop reason: HOSPADM

## 2020-10-20 RX ORDER — EPHEDRINE SULFATE 50 MG/ML
INJECTION INTRAVENOUS AS NEEDED
Status: DISCONTINUED | OUTPATIENT
Start: 2020-10-20 | End: 2020-10-20

## 2020-10-20 RX ADMIN — PHENYLEPHRINE HYDROCHLORIDE 100 MCG: 10 INJECTION INTRAVENOUS at 12:16

## 2020-10-20 RX ADMIN — SODIUM CHLORIDE 125 ML/HR: 0.9 INJECTION, SOLUTION INTRAVENOUS at 10:44

## 2020-10-20 RX ADMIN — HEPARIN SODIUM 5000 UNITS: 5000 INJECTION INTRAVENOUS; SUBCUTANEOUS at 15:00

## 2020-10-20 RX ADMIN — ACETAMINOPHEN 650 MG: 325 TABLET ORAL at 18:14

## 2020-10-20 RX ADMIN — SODIUM CHLORIDE, SODIUM LACTATE, POTASSIUM CHLORIDE, AND CALCIUM CHLORIDE 125 ML/HR: .6; .31; .03; .02 INJECTION, SOLUTION INTRAVENOUS at 15:03

## 2020-10-20 RX ADMIN — TRAMADOL HYDROCHLORIDE 50 MG: 50 TABLET, FILM COATED ORAL at 15:00

## 2020-10-20 RX ADMIN — EPHEDRINE SULFATE 10 MG: 50 INJECTION, SOLUTION INTRAVENOUS at 12:12

## 2020-10-20 RX ADMIN — SODIUM CHLORIDE 125 ML/HR: 0.9 INJECTION, SOLUTION INTRAVENOUS at 12:56

## 2020-10-20 RX ADMIN — FENTANYL CITRATE 25 MCG: 50 INJECTION INTRAMUSCULAR; INTRAVENOUS at 13:15

## 2020-10-20 RX ADMIN — FENTANYL CITRATE 50 MCG: 50 INJECTION, SOLUTION INTRAMUSCULAR; INTRAVENOUS at 12:03

## 2020-10-20 RX ADMIN — EPHEDRINE SULFATE 10 MG: 50 INJECTION, SOLUTION INTRAVENOUS at 11:40

## 2020-10-20 RX ADMIN — FENTANYL CITRATE 25 MCG: 50 INJECTION INTRAMUSCULAR; INTRAVENOUS at 13:25

## 2020-10-20 RX ADMIN — FENTANYL CITRATE 50 MCG: 50 INJECTION, SOLUTION INTRAMUSCULAR; INTRAVENOUS at 12:07

## 2020-10-20 RX ADMIN — LISINOPRIL 10 MG: 10 TABLET ORAL at 18:14

## 2020-10-20 RX ADMIN — HEPARIN SODIUM 5000 UNITS: 5000 INJECTION INTRAVENOUS; SUBCUTANEOUS at 21:15

## 2020-10-20 RX ADMIN — LIDOCAINE HYDROCHLORIDE 2.5 ML: 20 INJECTION, SOLUTION INFILTRATION; PERINEURAL at 11:58

## 2020-10-20 RX ADMIN — FENTANYL CITRATE 25 MCG: 50 INJECTION INTRAMUSCULAR; INTRAVENOUS at 13:20

## 2020-10-20 RX ADMIN — FENTANYL CITRATE 25 MCG: 50 INJECTION INTRAMUSCULAR; INTRAVENOUS at 13:32

## 2020-10-20 RX ADMIN — EPHEDRINE SULFATE 10 MG: 50 INJECTION, SOLUTION INTRAVENOUS at 11:37

## 2020-10-20 RX ADMIN — CEFAZOLIN SODIUM 1000 MG: 1 SOLUTION INTRAVENOUS at 20:29

## 2020-10-20 RX ADMIN — SODIUM CHLORIDE, SODIUM LACTATE, POTASSIUM CHLORIDE, AND CALCIUM CHLORIDE 125 ML/HR: .6; .31; .03; .02 INJECTION, SOLUTION INTRAVENOUS at 23:31

## 2020-10-20 RX ADMIN — PROPOFOL 150 MG: 10 INJECTION, EMULSION INTRAVENOUS at 11:58

## 2020-10-20 RX ADMIN — OXYBUTYNIN CHLORIDE 5 MG: 5 TABLET ORAL at 15:00

## 2020-10-20 RX ADMIN — PHENYLEPHRINE HYDROCHLORIDE 200 MCG: 10 INJECTION INTRAVENOUS at 12:29

## 2020-10-20 RX ADMIN — EPHEDRINE SULFATE 10 MG: 50 INJECTION, SOLUTION INTRAVENOUS at 12:16

## 2020-10-20 RX ADMIN — CEFAZOLIN SODIUM 2000 MG: 2 SOLUTION INTRAVENOUS at 11:44

## 2020-10-20 RX ADMIN — PROPOFOL 50 MG: 10 INJECTION, EMULSION INTRAVENOUS at 12:10

## 2020-10-21 ENCOUNTER — TELEPHONE (OUTPATIENT)
Dept: UROLOGY | Facility: MEDICAL CENTER | Age: 80
End: 2020-10-21

## 2020-10-21 VITALS
OXYGEN SATURATION: 93 % | TEMPERATURE: 96.6 F | RESPIRATION RATE: 18 BRPM | DIASTOLIC BLOOD PRESSURE: 88 MMHG | HEIGHT: 68 IN | SYSTOLIC BLOOD PRESSURE: 137 MMHG | WEIGHT: 188 LBS | HEART RATE: 78 BPM | BODY MASS INDEX: 28.49 KG/M2

## 2020-10-21 PROCEDURE — NC001 PR NO CHARGE: Performed by: PHYSICIAN ASSISTANT

## 2020-10-21 PROCEDURE — 99024 POSTOP FOLLOW-UP VISIT: CPT | Performed by: PHYSICIAN ASSISTANT

## 2020-10-21 RX ORDER — HYDROCODONE BITARTRATE AND ACETAMINOPHEN 5; 325 MG/1; MG/1
1 TABLET ORAL EVERY 6 HOURS PRN
Qty: 10 TABLET | Refills: 0 | Status: SHIPPED | OUTPATIENT
Start: 2020-10-21 | End: 2020-10-31

## 2020-10-21 RX ORDER — CEPHALEXIN 500 MG/1
500 CAPSULE ORAL 2 TIMES DAILY
Qty: 6 CAPSULE | Refills: 0 | Status: SHIPPED | OUTPATIENT
Start: 2020-10-21 | End: 2020-10-24

## 2020-10-21 RX ADMIN — TRAMADOL HYDROCHLORIDE 50 MG: 50 TABLET, FILM COATED ORAL at 09:05

## 2020-10-21 RX ADMIN — SODIUM CHLORIDE, SODIUM LACTATE, POTASSIUM CHLORIDE, AND CALCIUM CHLORIDE 125 ML/HR: .6; .31; .03; .02 INJECTION, SOLUTION INTRAVENOUS at 09:00

## 2020-10-21 RX ADMIN — ESCITALOPRAM OXALATE 10 MG: 10 TABLET ORAL at 08:59

## 2020-10-21 RX ADMIN — FLUTICASONE PROPIONATE 2 SPRAY: 50 SPRAY, METERED NASAL at 08:59

## 2020-10-21 RX ADMIN — CEFAZOLIN SODIUM 1000 MG: 1 SOLUTION INTRAVENOUS at 03:53

## 2020-10-21 NOTE — TELEPHONE ENCOUNTER
Patient called to say medication for pain and antibiotics has not been received by the pharmacy per patient  He is requesting for it to be resent

## 2020-10-23 ENCOUNTER — PROCEDURE VISIT (OUTPATIENT)
Dept: UROLOGY | Facility: CLINIC | Age: 80
End: 2020-10-23
Payer: MEDICARE

## 2020-10-23 VITALS
TEMPERATURE: 97.3 F | SYSTOLIC BLOOD PRESSURE: 140 MMHG | RESPIRATION RATE: 20 BRPM | WEIGHT: 188 LBS | DIASTOLIC BLOOD PRESSURE: 80 MMHG | HEIGHT: 68 IN | BODY MASS INDEX: 28.49 KG/M2 | HEART RATE: 80 BPM

## 2020-10-23 DIAGNOSIS — N13.8 BPH WITH OBSTRUCTION/LOWER URINARY TRACT SYMPTOMS: Primary | ICD-10-CM

## 2020-10-23 DIAGNOSIS — N40.1 BPH WITH OBSTRUCTION/LOWER URINARY TRACT SYMPTOMS: Primary | ICD-10-CM

## 2020-10-23 LAB — POST-VOID RESIDUAL VOLUME, ML POC: 77 ML

## 2020-10-23 PROCEDURE — 51798 US URINE CAPACITY MEASURE: CPT | Performed by: UROLOGY

## 2020-10-23 PROCEDURE — 99024 POSTOP FOLLOW-UP VISIT: CPT | Performed by: UROLOGY

## 2020-11-16 ENCOUNTER — OFFICE VISIT (OUTPATIENT)
Dept: UROLOGY | Facility: MEDICAL CENTER | Age: 80
End: 2020-11-16
Payer: MEDICARE

## 2020-11-16 VITALS — WEIGHT: 183 LBS | HEIGHT: 68 IN | BODY MASS INDEX: 27.74 KG/M2

## 2020-11-16 DIAGNOSIS — N13.8 BPH WITH OBSTRUCTION/LOWER URINARY TRACT SYMPTOMS: Primary | ICD-10-CM

## 2020-11-16 DIAGNOSIS — N40.1 BPH WITH OBSTRUCTION/LOWER URINARY TRACT SYMPTOMS: Primary | ICD-10-CM

## 2020-11-16 LAB
POST-VOID RESIDUAL VOLUME, ML POC: 22 ML
SL AMB  POCT GLUCOSE, UA: ABNORMAL
SL AMB LEUKOCYTE ESTERASE,UA: ABNORMAL
SL AMB POCT BILIRUBIN,UA: ABNORMAL
SL AMB POCT BLOOD,UA: ABNORMAL
SL AMB POCT CLARITY,UA: CLEAR
SL AMB POCT COLOR,UA: YELLOW
SL AMB POCT KETONES,UA: ABNORMAL
SL AMB POCT NITRITE,UA: ABNORMAL
SL AMB POCT PH,UA: 5
SL AMB POCT SPECIFIC GRAVITY,UA: 1.03
SL AMB POCT URINE PROTEIN: ABNORMAL
SL AMB POCT UROBILINOGEN: 0.2

## 2020-11-16 PROCEDURE — 51798 US URINE CAPACITY MEASURE: CPT | Performed by: UROLOGY

## 2020-11-16 PROCEDURE — 99024 POSTOP FOLLOW-UP VISIT: CPT | Performed by: UROLOGY

## 2020-11-16 PROCEDURE — 81003 URINALYSIS AUTO W/O SCOPE: CPT | Performed by: UROLOGY

## 2020-11-16 RX ORDER — TAMSULOSIN HYDROCHLORIDE 0.4 MG/1
CAPSULE ORAL
COMMUNITY
Start: 2020-11-09 | End: 2020-11-16 | Stop reason: ALTCHOICE

## 2021-11-22 ENCOUNTER — OFFICE VISIT (OUTPATIENT)
Dept: UROLOGY | Facility: MEDICAL CENTER | Age: 81
End: 2021-11-22
Payer: MEDICARE

## 2021-11-22 VITALS
HEIGHT: 68 IN | DIASTOLIC BLOOD PRESSURE: 82 MMHG | SYSTOLIC BLOOD PRESSURE: 128 MMHG | WEIGHT: 185 LBS | BODY MASS INDEX: 28.04 KG/M2

## 2021-11-22 DIAGNOSIS — N13.8 BPH WITH URINARY OBSTRUCTION: Primary | ICD-10-CM

## 2021-11-22 DIAGNOSIS — N40.1 BPH WITH URINARY OBSTRUCTION: Primary | ICD-10-CM

## 2021-11-22 PROCEDURE — 99213 OFFICE O/P EST LOW 20 MIN: CPT | Performed by: UROLOGY

## 2022-05-04 PROCEDURE — 87070 CULTURE OTHR SPECIMN AEROBIC: CPT | Performed by: OTOLARYNGOLOGY

## 2022-05-04 PROCEDURE — 87205 SMEAR GRAM STAIN: CPT | Performed by: OTOLARYNGOLOGY

## 2024-04-18 ENCOUNTER — OFFICE VISIT (OUTPATIENT)
Dept: UROLOGY | Facility: MEDICAL CENTER | Age: 84
End: 2024-04-18
Payer: MEDICARE

## 2024-04-18 VITALS
OXYGEN SATURATION: 97 % | DIASTOLIC BLOOD PRESSURE: 80 MMHG | SYSTOLIC BLOOD PRESSURE: 130 MMHG | WEIGHT: 185 LBS | BODY MASS INDEX: 29.03 KG/M2 | HEIGHT: 67 IN | HEART RATE: 83 BPM

## 2024-04-18 DIAGNOSIS — N13.8 BPH WITH OBSTRUCTION/LOWER URINARY TRACT SYMPTOMS: Primary | ICD-10-CM

## 2024-04-18 DIAGNOSIS — N40.1 BPH WITH OBSTRUCTION/LOWER URINARY TRACT SYMPTOMS: Primary | ICD-10-CM

## 2024-04-18 LAB
SL AMB  POCT GLUCOSE, UA: ABNORMAL
SL AMB LEUKOCYTE ESTERASE,UA: ABNORMAL
SL AMB POCT BILIRUBIN,UA: ABNORMAL
SL AMB POCT BLOOD,UA: ABNORMAL
SL AMB POCT CLARITY,UA: CLEAR
SL AMB POCT COLOR,UA: ABNORMAL
SL AMB POCT KETONES,UA: 15
SL AMB POCT NITRITE,UA: ABNORMAL
SL AMB POCT PH,UA: 5.5
SL AMB POCT SPECIFIC GRAVITY,UA: 1.03
SL AMB POCT URINE PROTEIN: ABNORMAL
SL AMB POCT UROBILINOGEN: 0.2

## 2024-04-18 PROCEDURE — 81003 URINALYSIS AUTO W/O SCOPE: CPT | Performed by: UROLOGY

## 2024-04-18 PROCEDURE — 99213 OFFICE O/P EST LOW 20 MIN: CPT | Performed by: UROLOGY

## 2024-04-18 RX ORDER — CODEINE PHOSPHATE/GUAIFENESIN 10-100MG/5
10 LIQUID (ML) ORAL 3 TIMES DAILY PRN
COMMUNITY

## 2024-04-18 NOTE — PROGRESS NOTES
"   HISTORY:    BPH    TURP in 2019, 16 g benign.  Overall good results, good stream and control, nocturia x 1-2.    No hematuria infection stones         ASSESSMENT / PLAN:    Doing well    Follow-up 2 years    The following portions of the patient's history were reviewed and updated as appropriate: allergies, current medications, past family history, past medical history, past social history, past surgical history, and problem list.    Review of Systems      Objective:     Physical Exam  Genitourinary:     Comments: Penis testes normal    Prostate mildly enlarged no nodules          No results found for: \"PSA\"]  BUN   Date Value Ref Range Status   08/31/2021 23 7 - 28 mg/dL Final     Creatinine   Date Value Ref Range Status   08/31/2021 0.88 0.53 - 1.30 mg/dL Final     No components found for: \"CBC\"      Patient Active Problem List   Diagnosis    BPH with urinary obstruction    Benign localized hyperplasia of prostate with urinary obstruction and lower urinary tract symptoms    Post-nasal drip    Maxillary sinus cyst    Chronic rhinitis        Diagnoses and all orders for this visit:    BPH with obstruction/lower urinary tract symptoms  -     POCT urine dip auto non-scope    Other orders  -     Propylene Glycol-Glycerin 1-0.3 % SOLN; Administer 1 drop to both eyes daily  -     guaifenesin-codeine (GUAIFENESIN AC) 100-10 MG/5ML liquid; Take 10 mL by mouth 3 (three) times a day as needed for cough           Patient ID: Zhen Wilburn is a 84 y.o. male.      Current Outpatient Medications:     escitalopram (LEXAPRO) 20 mg tablet, TAKE ONE-HALF TABLET BY MOUTH DAILY FOR MOOD, Disp: , Rfl:     fluticasone (FLONASE) 50 mcg/act nasal spray, 2 sprays into each nostril daily , Disp: , Rfl:     ibuprofen (MOTRIN) 600 mg tablet, Take 600 mg by mouth every 6 (six) hours as needed, Disp: , Rfl: 0    ipratropium (ATROVENT) 0.06 % nasal spray, 2 sprays into each nostril 2 (two) times a day as needed , Disp: , Rfl:     " "lisinopril (ZESTRIL) 10 mg tablet, Take 10 mg by mouth every evening, Disp: , Rfl:     simvastatin (ZOCOR) 20 mg tablet, Take 20 mg by mouth daily at bedtime , Disp: , Rfl:     guaifenesin-codeine (GUAIFENESIN AC) 100-10 MG/5ML liquid, Take 10 mL by mouth 3 (three) times a day as needed for cough, Disp: , Rfl:     Multiple Vitamins-Minerals (EMERGEN-C IMMUNE PO), Take by mouth Powder mix with water, Disp: , Rfl:     Propylene Glycol-Glycerin 1-0.3 % SOLN, Administer 1 drop to both eyes daily, Disp: , Rfl:     Past Medical History:   Diagnosis Date    Arthritis     Back pain     BPH with obstruction/lower urinary tract symptoms 2017    BPH with urinary obstruction 2012    Cervicalgia     not recently    Chronic sinusitis     Colon polyp     Elevated PSA 2012    Exercise involving walking     2 miles/day and prior to COVID gym 3x/wk    Frequency of urination     occas    Hearing aid worn     Heart murmur     and sees [facility] cardiologist yearly also for \"mitral valve issue\"    History of kidney stones     History of Lyme disease     History of pneumonia     Hyperlipidemia     Hypertension     Joint pain     Seasonal allergies     Seasonal allergies     Tinnitus        Past Surgical History:   Procedure Laterality Date    BACK SURGERY  1985    CARPAL TUNNEL RELEASE  2010    CATARACT EXTRACTION Bilateral     COLONOSCOPY      CYSTOSCOPY W/ URETEROSCOPY Left 2007    DENTAL SURGERY      upper and lower implants(7)    KNEE ARTHROSCOPY      FL TRURL ELECTROSURG RESCJ PROSTATE BLEED COMPLETE N/A 10/20/2020    Procedure: T.U.R.P.;  Surgeon: Ilya Flores MD;  Location: [facility];  Service: Urology    PROSTATE BIOPSY Bilateral 2012    ROTATOR CUFF REPAIR Bilateral 2008, 2010    Left- 2008; Right- 2010.    SINUS SURGERY  2008, 2012       Social History                 "

## (undated) DEVICE — GLOVE SRG BIOGEL 7.5

## (undated) DEVICE — BASIC SINGLE BASIN-LF: Brand: MEDLINE INDUSTRIES, INC.

## (undated) DEVICE — EXIDINE 4 PCT

## (undated) DEVICE — TUBING SUCTION 5MM X 12 FT

## (undated) DEVICE — Device

## (undated) DEVICE — GAUZE SPONGES,16 PLY: Brand: CURITY

## (undated) DEVICE — EVACUATOR BLADDER ELLIK DISP STRL

## (undated) DEVICE — 3M™ STERI-STRIP™ COMPOUND BENZOIN TINCTURE 40 BAGS/CARTON 4 CARTONS/CASE C1544: Brand: 3M™ STERI-STRIP™

## (undated) DEVICE — SCD SEQUENTIAL COMPRESSION COMFORT SLEEVE MEDIUM KNEE LENGTH: Brand: KENDALL SCD

## (undated) DEVICE — PACK TUR

## (undated) DEVICE — BAG URINE DRAINAGE 4000ML CONTINUOUS IRR

## (undated) DEVICE — UROCATCH BAG

## (undated) DEVICE — PREMIUM DRY TRAY LF: Brand: MEDLINE INDUSTRIES, INC.

## (undated) DEVICE — SINGLE PORT MANIFOLD: Brand: NEPTUNE 2

## (undated) DEVICE — INVIEW CLEAR LEGGINGS: Brand: CONVERTORS